# Patient Record
Sex: FEMALE | Race: WHITE
[De-identification: names, ages, dates, MRNs, and addresses within clinical notes are randomized per-mention and may not be internally consistent; named-entity substitution may affect disease eponyms.]

---

## 2018-04-16 NOTE — RADIOLOGY REPORT (SQ)
EXAM DESCRIPTION:  CT HEAD WITHOUT



COMPLETED DATE/TIME:  4/16/2018 5:27 pm



REASON FOR STUDY:  ams



COMPARISON:  None.



TECHNIQUE:  Axial images acquired through the brain without intravenous contrast.  Images reviewed wi
th bone, brain and subdural windows.  Additional sagittal and coronal reconstructions were generated.
 Images stored on PACS.

All CT scanners at this facility use dose modulation, iterative reconstruction, and/or weight based d
osing when appropriate to reduce radiation dose to as low as reasonably achievable (ALARA).

[facility]: Dose Right  [facility]: CareDose    [facility]: Dose Right    [facility]: Teradose 4D    [facility]: Smart Technologies



RADIATION DOSE:  CT Rad equipment meets quality standard of care and radiation dose reduction techniq
ues were employed. CTDIvol: 53.2 mGy. DLP: 1044 mGy-cm. mGy.



LIMITATIONS:  None.



FINDINGS:  VENTRICLES: Normal size and contour.

CEREBRUM: No masses.  No hemorrhage.  No midline shift.  No evidence for acute infarction. Few scatte
red areas of low density in the white matter most likely chronic small vessel ischemic changes.

CEREBELLUM: No masses.  No hemorrhage.  No alteration of density.  No evidence for acute infarction.

EXTRAAXIAL SPACES: No fluid collections.  No masses.

ORBITS AND GLOBE: No intra- or extraconal masses.  Normal contour of globe without masses.

CALVARIUM: No fracture.

PARANASAL SINUSES: No fluid or mucosal thickening.

SOFT TISSUES: No mass or hematoma.

OTHER: No other significant finding.



IMPRESSION:  MILD CHRONIC MICROVASCULAR ISCHEMIA. NO ACUTE IMAGING FINDINGS IN THE BRAIN.

EVIDENCE OF ACUTE STROKE: NO.



COMMENT:  Quality ID # 436: Final reports with documentation of one or more dose reduction techniques
 (e.g., Automated exposure control, adjustment of the mA and/or kV according to patient size, use of 
iterative reconstruction technique)



TECHNICAL DOCUMENTATION:  JOB ID:  [id number]

 2011 [employer]- All Rights Reserved



Reading location - IP/workstation name: [email or web address]

## 2018-04-16 NOTE — ER DOCUMENT REPORT
ED General





- General


Chief Complaint: Weakness


Stated Complaint: WEAKNESS


Time Seen by Provider: 04/16/18 16:52


Mode of Arrival: Ambulatory


TRAVEL OUTSIDE OF THE [place] IN LAST 30 DAYS: No





- HPI


Patient complains to provider of: Confusion yesterday


Onset: Yesterday


Onset/Duration: Gradual, Gone


Quality of pain: No pain


Associated symptoms: Nausea


Exacerbated by: Denies


Relieved by: Denies


Similar symptoms previously: Yes


Recently seen / treated by doctor: No


Notes: 





Patient is an 82-year-old female presenting to the emergency room with  

for complaints of confusion that occurred yesterday, started sometime in the 

morning and resolved around 10 PM at night, patient has a history of similar 

symptoms when she accidentally took an extra dose of her pain medication in the 

past, patient and  are unsure whether this may have happened yesterday 

as well, she denies any symptoms at present except for mild nausea, patient 

denies any pain, no fever, no vomiting or diarrhea, no dysuria or hematuria, no 

history of any recent injury or illness, patient and  deny any evidence 

of slurred speech or weakness on one side of the body or other, there was no 

facial droop at any time either





- Related Data


Allergies/Adverse Reactions: 


 





IVP dye Allergy (Uncoded 04/16/18 16:20)


 











Past Medical History





- General


Information source: Patient





- Social History


Smoking Status: Former Smoker


Chew tobacco use (# tins/day): No


Frequency of alcohol use: None


Drug Abuse: None


Family History: None


Patient has suicidal ideation: No


Patient has homicidal ideation: No





- Past Medical History


Cardiac Medical History: Reports: Hx Hypertension


Endocrine Medical History: Reports: Hx Diabetes Mellitus Type 2


Renal/ Medical History: Denies: Hx Peritoneal Dialysis


Musculoskeltal Medical History: Reports Hx Arthritis


Past Surgical History: Reports: Hx Appendectomy, Hx Hysterectomy, Hx Orthopedic 

Surgery - Right knee replaced, spinal fusion, Hx Tonsillectomy





- Immunizations


Hx Diphtheria, Pertussis, Tetanus Vaccination: Yes





Review of Systems





- Review of Systems


Constitutional: See HPI


EENT: No symptoms reported


Cardiovascular: No symptoms reported


Respiratory: No symptoms reported


Gastrointestinal: No symptoms reported


Genitourinary: No symptoms reported


Female Genitourinary: No symptoms reported


Musculoskeletal: No symptoms reported


Skin: No symptoms reported


Hematologic/Lymphatic: No symptoms reported


Neurological/Psychological: Confusion


-: Yes All other systems reviewed and negative





Physical Exam





- Vital signs


Vitals: 


 











Temp Pulse Resp BP Pulse Ox


 


 98.1 F   97   18   173/77 H  90 L


 


 04/16/18 16:25  04/16/18 16:25  04/16/18 16:25  04/16/18 16:25  04/16/18 16:25











Interpretation: Normal





- General


General appearance: Appears well, Alert





- HEENT


Head: Normocephalic, Atraumatic


Eyes: Normal


Pupils: PERRL





- Respiratory


Respiratory status: No respiratory distress


Chest status: Nontender


Breath sounds: Normal


Chest palpation: Normal





- Cardiovascular


Rhythm: Regular


Heart sounds: Normal auscultation


Murmur: No





- Abdominal


Inspection: Normal


Distension: No distension


Bowel sounds: Normal


Tenderness: Nontender


Organomegaly: No organomegaly





- Back


Back: Normal, Nontender





- Extremities


General upper extremity: Normal inspection, Nontender, Normal color, Normal ROM

, Normal temperature


General lower extremity: Normal inspection, Nontender, Normal color, Normal ROM

, Normal temperature, Normal weight bearing.  No: Adrian's sign





- Neurological


Neuro grossly intact: Yes


Cognition: Normal


Orientation: AAOx4


Kell Coma Scale Eye Opening: Spontaneous


[place] Coma Scale Verbal: Oriented


Kell Coma Scale Motor: Obeys Commands


Kell Coma Scale Total: 15


Speech: Normal


Motor strength normal: LUE, RUE, LLE, RLE


Sensory: Normal





- Psychological


Associated symptoms: Normal affect, Normal mood





- Skin


Skin Temperature: Warm


Skin Moisture: Dry


Skin Color: Normal





Course





- Re-evaluation


Re-evalutation: 





04/16/18 19:06


Patient with confusion that occurred yesterday and resolved around 10 PM last 

night, no evidence of any TIA or CVA, labs are relatively unremarkable except 

for mild leukocytosis without signs of an infection, patient was advised to 

resume her medications this evening, follow-up with primary care or return if 

symptoms worsen, patient and spouse at bedside acknowledge understanding and 

agreement with this plan





- Vital Signs


Vital signs: 


 











Temp Pulse Resp BP Pulse Ox


 


 98.7 F   88   16   136/88 H  96 


 


 04/16/18 19:02  04/16/18 19:02  04/16/18 19:02  04/16/18 19:02  04/16/18 19:02














- Laboratory


Result Diagrams: 


 04/16/18 17:06





 04/16/18 17:06


Laboratory results interpreted by me: 


 











  04/16/18 04/16/18 04/16/18





  17:06 17:06 17:06


 


WBC  16.1 H  


 


RBC  3.60 L  


 


Hgb  10.5 L  


 


Hct  32.2 L  


 


RDW  16.5 H  


 


Seg Neuts % (Manual)  85 H  


 


Lymphocytes % (Manual)  5 L  


 


Abs Neuts (Manual)  13.7 H  


 


Chloride   97 L 


 


Carbon Dioxide   32 H 


 


BUN   64 H 


 


Creatinine   1.87 H 


 


Est GFR ( Amer)   31 L 


 


Est GFR (Non-Af Amer)   26 L 


 


Glucose   199 H 


 


Urine Protein    100 H


 


Urine Blood    SMALL H














- Diagnostic Test


Radiology reviewed: Image reviewed, Reports reviewed





Discharge





- Discharge


Clinical Impression: 


 Confusion





Medication adverse effect


Qualifiers:


 Encounter type: initial encounter Qualified Code(s): T88.7XXA - Unspecified 

adverse effect of drug or medicament, initial encounter





Condition: Stable


Disposition: HOME, SELF-CARE


Instructions:  Medication Side Effects ([facility]), Overdose / Ingestion ([facility])


Additional Instructions: 


Follow up with your primary care provider in one to 2 days.  Return to the 

emergency room immediately if symptoms worsen or any additional concerns.  

Resume your regular medications this evening.  Start taking your medications as 

usual in the morning.

## 2018-04-16 NOTE — ER DOCUMENT REPORT
ED Medical Screen (RME)





- General


Chief Complaint: Weakness


Stated Complaint: WEAKNESS


Time Seen by Provider: 04/16/18 16:52


Mode of Arrival: Ambulatory


Information source: Patient


Notes: 





Patient states that she is felt confused intermittently since yesterday.  

However patient is alert and oriented 4 and answers all questions 

appropriately.  Patient does take chronic pain medication and according to her 

 sometimes accidentally takes more than she is supposed to.  Patient 

denies any abdominal pain.  No nausea vomiting or diarrhea.  No cough cold or 

congestion.  


TRAVEL OUTSIDE OF THE [place] IN LAST 30 DAYS: No





- Related Data


Allergies/Adverse Reactions: 


 





IVP dye Allergy (Uncoded 04/16/18 16:20)


 











Past Medical History





- Social History


Chew tobacco use (# tins/day): No


Frequency of alcohol use: None


Drug Abuse: None





- Past Medical History


Cardiac Medical History: Reports: Hx Hypertension


Endocrine Medical History: Reports: Hx Diabetes Mellitus Type 2


Renal/ Medical History: Denies: Hx Peritoneal Dialysis


Musculoskeltal Medical History: Reports Hx Arthritis


Past Surgical History: Reports: Hx Appendectomy, Hx Hysterectomy, Hx Orthopedic 

Surgery - Right knee replaced, spinal fusion, Hx Tonsillectomy





- Immunizations


Hx Diphtheria, Pertussis, Tetanus Vaccination: Yes





Physical Exam





- Vital signs


Vitals: 





 











Temp Pulse Resp BP Pulse Ox


 


 98.1 F   97   18   173/77 H  90 L


 


 04/16/18 16:25  04/16/18 16:25  04/16/18 16:25  04/16/18 16:25  04/16/18 16:25














Course





- Vital Signs


Vital signs: 





 











Temp Pulse Resp BP Pulse Ox


 


 98.1 F   87   18   173/77 H  96 


 


 04/16/18 16:25  04/16/18 16:41  04/16/18 16:41  04/16/18 16:25  04/16/18 16:41

## 2018-04-18 NOTE — ER DOCUMENT REPORT
ED General





- General


Chief Complaint: Abdominal Pain


Stated Complaint: ABDOMINAL PAIN


Time Seen by Provider: 04/18/18 01:12


Notes: 





Patient is an 82-year-old female with medical history as recorded who presents 

with 24 hours of nausea, vomiting and diarrhea.  She also notes associated 

abdominal cramping as a dull, intermittent, generalized cramping pain.  Nothing 

improves or worsens her symptoms.  She states that she was worried that she was 

becoming dehydrated as she had not been able to tolerate oral intake for the 

past several hours.  She denies any history of similar symptoms in the past.  

She has not seen a primary doctor regarding these concerns.  She denies any 

fever, cough, sputum production, weakness, numbness or altered mental status.  

She was seen in the emergency room and several days ago for possible medication 

reaction with associated confusion but notes that she has not had a recurrence 

of symptoms since that time.


TRAVEL OUTSIDE OF THE [place] IN LAST 30 DAYS: No





- Related Data


Allergies/Adverse Reactions: 


 





IVP dye Allergy (Uncoded 04/16/18 16:20)


 











Past Medical History





- General


Information source: Patient, Relative





- Social History


Smoking Status: Never Smoker


Frequency of alcohol use: None


Drug Abuse: None


Lives with: Spouse/Significant other


Family History: Reviewed & Not Pertinent





- Past Medical History


Cardiac Medical History: Reports: Hx Hypertension


Endocrine Medical History: Reports: Hx Diabetes Mellitus Type 2


Renal/ Medical History: Denies: Hx Peritoneal Dialysis


Musculoskeltal Medical History: Reports Hx Arthritis


Past Surgical History: Reports: Hx Appendectomy, Hx Hysterectomy, Hx Orthopedic 

Surgery - Right knee replaced, spinal fusion, Hx Tonsillectomy





- Immunizations


Hx Diphtheria, Pertussis, Tetanus Vaccination: Yes





Review of Systems





- Review of Systems


Notes: 





Constitutional: Negative for fever.


HENT: Negative for sore throat.


Eyes: Negative for visual changes.


Cardiovascular: Negative for chest pain.


Respiratory: Negative for shortness of breath.


Gastrointestinal: Positive for abdominal cramping, vomiting and diarrhea


Genitourinary: Negative for dysuria.


Musculoskeletal: Negative for back pain.


Skin: Negative for rash.


Neurological: Negative for headaches, weakness or numbness.





10 point ROS negative except as marked above and in HPI.





Physical Exam





- Vital signs


Vitals: 


 











Temp Pulse Resp BP Pulse Ox


 


 98.6 F   91   16   147/77 H  92 


 


 04/18/18 00:14  04/18/18 00:14  04/18/18 00:14  04/18/18 00:14  04/18/18 00:14











Interpretation: Normal


Notes: 





PHYSICAL EXAMINATION:





GENERAL: Well-appearing, well-nourished and in no acute distress.





HEAD: Atraumatic, normocephalic.





EYES: Pupils equal round and reactive to light, extraocular movements intact, 

sclera anicteric, conjunctiva are normal.





ENT: nares patent, oropharynx clear without exudates.  Moderately dry mucous 

membranes.





NECK: Normal range of motion, supple without lymphadenopathy





LUNGS: Breath sounds clear to auscultation bilaterally and equal.  No wheezes 

rales or rhonchi.





HEART: Regular rate and rhythm without murmurs





ABDOMEN: Soft, nontender, normoactive bowel sounds.  No guarding, no rebound.  

No masses appreciated.





EXTREMITIES: Normal range of motion, no pitting or edema.  No cyanosis.





NEUROLOGICAL: No focal neurological deficits. Moves all extremities 

spontaneously and on command.





PSYCH: Normal mood, normal affect.





SKIN: Warm, Dry, normal turgor, no rashes or lesions noted.





Course





- Re-evaluation


Re-evalutation: 





04/18/18 01:41


Patient presents with complaints of abdominal cramping, nausea, vomiting and 

diarrhea that has been present for the past 12-24 hours.  She is otherwise very 

well in appearance on exam, vitals normal limits, no evidence of dehydration.  

She has no focal abdominal tenderness on examination.  No rebound or guarding.  

Will obtain basic laboratories, I do not see an obvious indication for imaging 

at this time as I have a very low clinical suspicion for any life-threatening 

pathology including acute appendicitis, biliary colic, mesenteric ischemia, 

aortic pathology, or bowel obstruction


04/18/18 02:27


Laboratories show that the patient's leukocytosis is actually improving, renal 

function is likewise improving.  Patient has not had any vomiting or diarrhea 

while here in the emergency department.  Her daughter pulls me aside and 

actually tells me that the patient is "dramatic and kind of crazy".  She states 

that she believes the patient may be seeking attention and that she has not 

witnessed any evidence of vomiting or diarrhea at home.  Repeat abdominal exam 

without any focal tenderness, rebound or guarding.  At this time will discharge 

with return precautions and follow-up recommendations.  Verbal discharge 

instructions given a the bedside and opportunity for questions given. 

Medication warnings reviewed. Patient is in agreement with this plan and has 

verbalized understanding of return precautions and the need for primary care 

follow-up in the next 24-72 hours.





- Vital Signs


Vital signs: 


 











Temp Pulse Resp BP Pulse Ox


 


 98.6 F   91   16   147/77 H  92 


 


 04/18/18 00:14  04/18/18 00:14  04/18/18 00:14  04/18/18 00:14  04/18/18 00:14














- Laboratory


Result Diagrams: 


 04/18/18 01:30





 04/18/18 01:30


Laboratory results interpreted by me: 


 











  04/18/18 04/18/18





  01:30 01:30


 


WBC  15.9 H 


 


RBC  3.67 L 


 


Hgb  10.7 L 


 


Hct  32.2 L 


 


RDW  16.4 H 


 


Seg Neuts % (Manual)  93 H 


 


Lymphocytes % (Manual)  2 L 


 


Abs Neuts (Manual)  14.8 H 


 


Abs Lymphs (Manual)  0.3 L 


 


BUN   48 H


 


Creatinine   1.51 H


 


Est GFR ( Amer)   40 L


 


Est GFR (Non-Af Amer)   33 L


 


Glucose   171 H


 


Direct Bilirubin   0.5 H














Discharge





- Discharge


Clinical Impression: 


 Vomiting and diarrhea, Abdominal cramping, Dehydration





Condition: Good


Disposition: HOME, SELF-CARE


Additional Instructions: 


Your symptoms are likely due to a viral illness and should resolve in the next 

several days.  You can take over-the-counter loperamide also known as Imodium 

as needed for diarrhea per box instructions.  Continue to stay hydrated with 

plenty of solution such as Gatorade or Pedialyte.  You are being prescribed 

Zofran to take as needed for nausea and vomiting.  Please return if you develop 

severe abdominal pain, pass out, become unable to tolerate any oral fluids for 

12 more hours, or any other symptoms that are concerning to you.

## 2018-04-29 NOTE — EKG REPORT
SEVERITY:- ABNORMAL ECG -

ATRIAL FIBRILLATION

LOW VOLTAGE IN FRONTAL LEADS

NONSPECIFIC T ABNORMALITIES, LATERAL LEADS

:

Confirmed by: Petros Donato MD 29-Apr-2018 17:59:10

## 2018-04-29 NOTE — RADIOLOGY REPORT (SQ)
EXAM DESCRIPTION:  CT ABD/PELVIS ORAL ONLY



COMPLETED DATE/TIME:  4/29/2018 6:19 pm



REASON FOR STUDY:  diffuse abdominal pain h/o cancer



COMPARISON:  None.



TECHNIQUE:  CT scan of the abdomen and pelvis performed without intravenous or oral contrast. Images 
reviewed with lung, soft tissue, and bone windows. Reconstructed coronal and sagittal MPR images revi
ewed. All images stored on PACS.

All CT scanners at this facility use dose modulation, iterative reconstruction, and/or weight based d
osing when appropriate to reduce radiation dose to as low as reasonably achievable (ALARA).

[facility]: Dose Right  [facility]: CareDose    [facility]: Dose Right    [facility]: Teradose 4D    [facility]: Smart [employer]



RADIATION DOSE:  CT Rad equipment meets quality standard of care and radiation dose reduction techniq
ues were employed. CTDIvol: 13.4 mGy. DLP: 680 mGy-cm.mGy.



LIMITATIONS:  None.



FINDINGS:  LOWER CHEST: Small left pleural effusion.

NON-CONTRASTED LIVER, SPLEEN, ADRENALS: Evaluation limited by lack of IV contrast.  Small Calcificati
ons in the left lobe.  No identified significant masses.

PANCREAS: Mild peripancreatic inflammatory changes adjacent to the pancreatic head- proximal duodenum
.

GALLBLADDER: No identified stones by CT criteria. No inflammatory changes to suggest cholecystitis.

RIGHT KIDNEY AND URETER: Multiple cysts. Assessment limited by lack of IV contrast.   No significant 
calcifications.   No hydronephrosis or hydroureter.

LEFT KIDNEY AND URETER: Multiple cysts. Assessment limited by lack of IV contrast.   No significant c
alcifications.   No hydronephrosis or hydroureter.

AORTA AND RETROPERITONEUM: No aneurysm. No retroperitoneal masses or adenopathy.

BOWEL AND PERITONEAL CAVITY: Mild wall thickening in the region of the pylorus and proximal duodenum 
with adjacent mesenteric inflammatory changes.  Colonic diverticulosis.

APPENDIX: Not visualized.

PELVIS, BLADDER, AND ABDOMINAL WALL:Prior hysterectomy. No free fluid. Bladder normal.

BONES: No acute findings.

OTHER: No other significant finding.



IMPRESSION:  Mild wall thickening in the region of the pylorus and proximal duodenum with adjacent me
senteric inflammatory changes.  Mild peripancreatic inflammatory changes adjacent to the pancreatic h
ead- proximal duodenum.

Small left pleural effusion.



COMMENT:  Quality ID # 436: Final reports with documentation of one or more dose reduction techniques
 (e.g., Automated exposure control, adjustment of the mA and/or kV according to patient size, use of 
iterative reconstruction technique)



TECHNICAL DOCUMENTATION:  JOB ID:  [id number]

[id number]

 2011 [employer]- All Rights Reserved



Reading location - IP/workstation name: [email or web address]

## 2018-04-29 NOTE — RADIOLOGY REPORT (SQ)
EXAM DESCRIPTION:  CHEST 2 VIEWS



COMPLETED DATE/TIME:  4/29/2018 6:17 pm



REASON FOR STUDY:  Short of breath



COMPARISON:  None.



EXAM PARAMETERS:  NUMBER OF VIEWS: two views

TECHNIQUE: Digital Frontal and Lateral radiographic views of the chest acquired.

RADIATION DOSE: NA

LIMITATIONS: none



FINDINGS:  LUNGS AND PLEURA: No consolidation, masses or pneumothorax.  Mild chronic appearing inters
titial changes.  No pleural effusion.

MEDIASTINUM AND HILAR STRUCTURES: Age-appropriate contour.

HEART AND VASCULAR STRUCTURES: Heart upper limits of normal size.

BONES: No acute findings.

HARDWARE: None in the chest.

OTHER: No other significant finding.



IMPRESSION:  No consolidation or pleural effusion.



TECHNICAL DOCUMENTATION:  JOB ID:  [id number]

[id number]

 2011 [employer]- All Rights Reserved



Reading location - IP/workstation name: [email or web address]

## 2018-04-29 NOTE — ER DOCUMENT REPORT
ED Medical Screen (RME)





- General


Chief Complaint: Abdominal Pain


Stated Complaint: STOMACH PAIN


Time Seen by Provider: 04/29/18 14:04


Notes: 





Patient says that she is been having stomach pains for the past 3 days.  Pains 

are located in the lower central portion of the abdomen.  Has been nauseated 

and has had some dry heaves this morning but has not had any actual emesis.  

She had some diarrhea yesterday, less today.  She suffers from constipation.  

Patient has a history of surgery removing colon cancer.  Never had to have 

chemo or radiation treatment.  Has not been sick recently.  Denies fever.





Patient has some shortness of breath, and it is worse in recent days.  History 

of COPD.  Does not smoke.


TRAVEL OUTSIDE OF THE [place] IN LAST 30 DAYS: No





- Related Data


Allergies/Adverse Reactions: 


 





IVP dye Allergy (Uncoded 04/29/18 13:02)


 











Past Medical History





- Social History


Chew tobacco use (# tins/day): No


Frequency of alcohol use: None


Drug Abuse: None





- Past Medical History


Cardiac Medical History: Reports: Hx Hypertension


Endocrine Medical History: Reports: Hx Diabetes Mellitus Type 2


Renal/ Medical History: Denies: Hx Peritoneal Dialysis


Musculoskeltal Medical History: Reports Hx Arthritis - in pain management


Past Surgical History: Reports: Hx Appendectomy, Hx Bowel Surgery - umbilical 

hernia, Hx Hysterectomy, Hx Orthopedic Surgery - Right knee replaced, spinal 

fusion, Hx Tonsillectomy





- Immunizations


Hx Diphtheria, Pertussis, Tetanus Vaccination: Yes





Physical Exam





- Vital signs


Vitals: 





 











Temp Pulse Resp BP Pulse Ox


 


 97.6 F   75   18   175/71 H  96 


 


 04/29/18 13:07  04/29/18 13:07  04/29/18 13:07  04/29/18 13:07  04/29/18 13:07














Course





- Vital Signs


Vital signs: 





 











Temp Pulse Resp BP Pulse Ox


 


 97.6 F   75   18   175/71 H  96 


 


 04/29/18 13:07  04/29/18 13:07  04/29/18 13:07  04/29/18 13:07  04/29/18 13:07














Doctor's Discharge





- Discharge


Referrals: 


JEERL ROPER PA-C [Primary Care Provider] - Follow up as needed

## 2018-04-29 NOTE — ER DOCUMENT REPORT
ED General





- General


Chief Complaint: Abdominal Pain


Stated Complaint: STOMACH PAIN


Time Seen by Provider: 04/29/18 14:04


Mode of Arrival: Ambulatory


Information source: Patient


Notes: 





82-year-old female with a history of colon cancer, constipation, diabetes, 

hypertension, COPD, renal insufficiency presents with complaint of abdominal 

pain that started 3 days prior to arrival.  Patient states pain is constant, 

burning and located diffusely.  She has had prior similar symptoms. she has had 

associated nausea without vomiting.  Patient has also been experiencing 

intermittent episodes of diarrhea that she states is normal in color.  Patient 

is currently in pain management for chronic back pain and takes morphine daily.

  Patient's last colonoscopy was less than a year ago and patient and  

report that they were told "everything looked good."  Patient denies any sick 

contacts, recent antibiotic use, recent hospitalizations.  She denies any black 

or bloody stools.  She does admit to urinary frequency, burning with urination.

  She denies any vaginal discharge.  She has surgical history of colectomy, 

hysterectomy, appendectomy.  She states she is passing gas.


TRAVEL OUTSIDE OF THE [place] IN LAST 30 DAYS: No





- HPI


Onset: Other - 3 days prior to arrival


Onset/Duration: Gradual, Persistent


Quality of pain: Burning


Severity: Mild


Associated symptoms: Diarrhea, Nausea.  denies: Chest pain, Fever, Vomiting


Exacerbated by: Food


Relieved by: Denies


Similar symptoms previously: No


Recently seen / treated by doctor: No





- Related Data


Allergies/Adverse Reactions: 


 





IVP dye Allergy (Uncoded 04/29/18 13:02)


 











Past Medical History





- General


Information source: Patient





- Social History


Smoking Status: Former Smoker


Chew tobacco use (# tins/day): No


Frequency of alcohol use: None


Drug Abuse: None


Lives with: Spouse/Significant other


Family History: Reviewed & Not Pertinent


Patient has suicidal ideation: No


Patient has homicidal ideation: No





- Past Medical History


Cardiac Medical History: Reports: Hx Atrial Fibrillation, Hx Hypertension


Pulmonary Medical History: Reports: Hx COPD


Endocrine Medical History: Reports: Hx Diabetes Mellitus Type 2, Other - Renal 

insufficiency


Renal/ Medical History: Denies: Hx Peritoneal Dialysis


Malignancy Medical History: Reports: Hx Colorectal Cancer


Musculoskeltal Medical History: Reports Hx Arthritis - in pain management


Past Surgical History: Reports: Hx Appendectomy, Hx Bowel Surgery - umbilical 

hernia, Hx Hysterectomy, Hx Orthopedic Surgery - Right knee replaced, spinal 

fusion, Hx Tonsillectomy





- Immunizations


Hx Diphtheria, Pertussis, Tetanus Vaccination: Yes





Review of Systems





- Review of Systems


Notes: 





Patient denies fever, chills, vomiting, headache, ear pain, sore throat, cough, 

chest pain, shortness of breath, back pain,  hematuria, rash, SI/HI.  Admits to 

abdominal pain, nausea, diarrhea, dysuria.





Physical Exam





- Vital signs


Vitals: 


 











Temp Pulse Resp BP Pulse Ox


 


 97.6 F   75   18   175/71 H  96 


 


 04/29/18 13:07  04/29/18 13:07  04/29/18 13:07  04/29/18 13:07  04/29/18 13:07











Interpretation: Normal, Hypertensive.  No: Tachycardic, Hypoxic, Febrile


Notes: 





PHYSICAL EXAMINATION:





GENERAL: Well-appearing, well-nourished and in no acute distress.





HEAD: Atraumatic, normocephalic.





EYES: Pupils equal round and reactive to light, extraocular movements intact, 

conjunctiva are normal.





ENT: Nares patent, oropharynx clear without exudates.  Moist mucous membranes.





NECK: Normal range of motion, supple without lymphadenopathy





LUNGS: Breath sounds clear to auscultation bilaterally and equal.  No wheezes 

rales or rhonchi.





HEART: Regular rate and rhythm without murmurs





ABDOMEN: Diffuse tenderness with palpation, no guarding, no rebound.  No masses 

appreciated.





Female : deferred





Musculoskeletal: Normal range of motion, no pitting or edema.  No cyanosis.





NEUROLOGICAL: Cranial nerves grossly intact.  Normal speech, normal gait.  

Normal sensory, motor exams





PSYCH: Normal mood, normal affect.





SKIN: Warm, Dry, normal turgor, no rashes or lesions noted.





- Notes


Notes: 





PHYSICAL EXAMINATION:





GENERAL: Well-appearing, well-nourished and in no acute distress.





HEAD: Atraumatic, normocephalic.





EYES: Pupils equal round and reactive to light, extraocular movements intact, 

conjunctiva are normal.





ENT: Nares patent, oropharynx clear without exudates.  Moist mucous membranes.





NECK: Normal range of motion, supple without lymphadenopathy





LUNGS: Breath sounds clear to auscultation bilaterally and equal.  No wheezes 

rales or rhonchi.





HEART: Regular rate and rhythm without murmurs





ABDOMEN: Soft, nontender, nondistended abdomen.  No guarding, no rebound.  No 

masses appreciated. 





Female : deferred





Musculoskeletal: Normal range of motion, no pitting or edema.  No cyanosis.





NEUROLOGICAL: Cranial nerves grossly intact.  Normal speech, normal gait.  

Normal sensory, motor exams





PSYCH: Normal mood, normal affect.





SKIN: Warm, Dry, normal turgor, no rashes or lesions noted.





Course





- Re-evaluation


Re-evalutation: 





05/01/18 00:09





Laboratory











  04/29/18 04/29/18 04/29/18





  14:52 14:52 14:52


 


WBC  14.0 H  


 


RBC  3.38 L  


 


Hgb  9.7 L  


 


Hct  29.5 L  


 


MCV  87  


 


MCH  28.6  


 


MCHC  32.7  


 


RDW  15.3 H  


 


Plt Count  286  


 


Seg Neutrophils %  86.6 H  


 


Lymphocytes %  6.2 L  


 


Monocytes %  5.5  


 


Eosinophils %  1.2  


 


Basophils %  0.5  


 


Absolute Neutrophils  12.1 H  


 


Absolute Lymphocytes  0.9  


 


Absolute Monocytes  0.8  


 


Absolute Eosinophils  0.2  


 


Absolute Basophils  0.1  


 


Sodium   142.4 


 


Potassium   4.7 


 


Chloride   99 


 


Carbon Dioxide   36 H 


 


Anion Gap   7 


 


BUN   27 H 


 


Creatinine   1.37 H 


 


Est GFR ( Amer)   45 L 


 


Est GFR (Non-Af Amer)   37 L 


 


Glucose   138 H 


 


Calcium   9.5 


 


Total Bilirubin   0.6 


 


Direct Bilirubin   0.3 


 


Neonat Total Bilirubin   Not Reportable 


 


Neonat Direct Bilirubin   Not Reportable 


 


Neonat Indirect Bili   Not Reportable 


 


AST   13 L 


 


ALT   25 


 


Alkaline Phosphatase   78 


 


CK-MB (CK-2)    0.80


 


Troponin I    0.026


 


NT-Pro-B Natriuret Pep    4590 H


 


Total Protein   6.8 


 


Albumin   3.6 


 


Lipase   94.3 


 


Urine Color   


 


Urine Appearance   


 


Urine pH   


 


Ur Specific Gravity   


 


Urine Protein   


 


Urine Glucose (UA)   


 


Urine Ketones   


 


Urine Blood   


 


Urine Nitrite   


 


Urine Bilirubin   


 


Urine Urobilinogen   


 


Ur Leukocyte Esterase   


 


Urine WBC (Auto)   


 


Urine RBC (Auto)   


 


Urine Bacteria (Auto)   


 


Squamous Epi Cells Auto   


 


Urine Mucus (Auto)   


 


Urine Ascorbic Acid   














  04/29/18





  15:52


 


WBC 


 


RBC 


 


Hgb 


 


Hct 


 


MCV 


 


MCH 


 


MCHC 


 


RDW 


 


Plt Count 


 


Seg Neutrophils % 


 


Lymphocytes % 


 


Monocytes % 


 


Eosinophils % 


 


Basophils % 


 


Absolute Neutrophils 


 


Absolute Lymphocytes 


 


Absolute Monocytes 


 


Absolute Eosinophils 


 


Absolute Basophils 


 


Sodium 


 


Potassium 


 


Chloride 


 


Carbon Dioxide 


 


Anion Gap 


 


BUN 


 


Creatinine 


 


Est GFR ( Amer) 


 


Est GFR (Non-Af Amer) 


 


Glucose 


 


Calcium 


 


Total Bilirubin 


 


Direct Bilirubin 


 


Neonat Total Bilirubin 


 


Neonat Direct Bilirubin 


 


Neonat Indirect Bili 


 


AST 


 


ALT 


 


Alkaline Phosphatase 


 


CK-MB (CK-2) 


 


Troponin I 


 


NT-Pro-B Natriuret Pep 


 


Total Protein 


 


Albumin 


 


Lipase 


 


Urine Color  YELLOW


 


Urine Appearance  CLEAR


 


Urine pH  7.0


 


Ur Specific Gravity  1.012


 


Urine Protein  >=500 H


 


Urine Glucose (UA)  NEGATIVE


 


Urine Ketones  NEGATIVE


 


Urine Blood  SMALL H


 


Urine Nitrite  NEGATIVE


 


Urine Bilirubin  NEGATIVE


 


Urine Urobilinogen  NEGATIVE


 


Ur Leukocyte Esterase  NEGATIVE


 


Urine WBC (Auto)  1


 


Urine RBC (Auto)  2


 


Urine Bacteria (Auto)  TRACE


 


Squamous Epi Cells Auto  2


 


Urine Mucus (Auto)  RARE


 


Urine Ascorbic Acid  NEGATIVE














 





Chest X-Ray  04/29/18 14:04


IMPRESSION:  No consolidation or pleural effusion.


 








Abdomen/Pelvis CT  04/29/18 15:02


IMPRESSION:  Mild wall thickening in the region of the pylorus and proximal 

duodenum with adjacent mesenteric inflammatory changes.  Mild peripancreatic 

inflammatory changes adjacent to the pancreatic head- proximal duodenum.


Small left pleural effusion.


 











05/01/18 00:11


82-year-old female with a history of colon cancer, constipation, diabetes, 

hypertension, COPD, renal insufficiency presents with complaint of abdominal 

pain that started 3 days prior to arrival.  Patient states pain is constant, 

burning and located diffusely.  She has had prior similar symptoms. she has had 

associated nausea without vomiting.  Patient has also been experiencing 

intermittent episodes of diarrhea.  Upon arrival vitals reviewed.  Patient is 

afebrile, mildly hypertensive but with a normal heart rate and oxygen 

saturation.  Abdominal exam is significant for diffuse mild tenderness.  There 

is no guarding or rebound or evidence of peritonitis.  CT with oral contrast 

was obtained and showed mild wall thickening around the pylorus, duodenum and 

mild hina-pancreatic inflammation as well as a small left pleural effusion.  

Patient has a leukocytosis but when compared to recent blood work this has 

improved.  Patient also has renal insufficiency but again this is improved when 

compared to blood work done 2 weeks prior to this visit.  Patient has an 

elevated BNP of 4500.  No previous BNPs to compare at this time.  Patient was 

provided Zofran, morphine, Lasix during her ED course.  Patient has been 

tolerating fluids, asking for food during throughout her visit.  I did discuss 

findings with the patient, her  and daughter.  I spoke to the daughter 

over the mother's cell phone and she asked me to please encourage the patient 

had a healthier diet, stay away from soda.  I have done this and recommend that 

the patient exercise bowel rest for the next few days taking in clear fluids 

only.  Patient is very upset with this request.  Although the patient does have 

discontinues her evidence of colitis I do not feel hospitalization is warranted 

at this time since she is able to eat and drink without difficulty.  I believe 

if patient practices proper diet habits which we have discussed, chronic pain 

medication and participates and bowel rest for the next few days which we have 

also discussed that she will do okay.  I have encouraged the patient to return 

to the emergency department if abdominal pain worsens, she is unable to 

tolerate fluids.  Patient,  and daughter are comfortable with discharge 

at this time. 





- Vital Signs


Vital signs: 


 











Temp Pulse Resp BP Pulse Ox


 


 98 F   76   18   160/85 H  93 


 


 04/29/18 20:02  04/29/18 20:02  04/29/18 20:02  04/29/18 20:02  04/29/18 20:02














- Laboratory


Result Diagrams: 


 04/29/18 14:52





 04/29/18 14:52


Laboratory results interpreted by me: 


 











  04/29/18 04/29/18 04/29/18





  14:52 14:52 14:52


 


WBC  14.0 H  


 


RBC  3.38 L  


 


Hgb  9.7 L  


 


Hct  29.5 L  


 


RDW  15.3 H  


 


Seg Neutrophils %  86.6 H  


 


Lymphocytes %  6.2 L  


 


Absolute Neutrophils  12.1 H  


 


Carbon Dioxide   36 H 


 


BUN   27 H 


 


Creatinine   1.37 H 


 


Est GFR ( Amer)   45 L 


 


Est GFR (Non-Af Amer)   37 L 


 


Glucose   138 H 


 


AST   13 L 


 


NT-Pro-B Natriuret Pep    4590 H


 


Urine Protein   


 


Urine Blood   














  04/29/18





  15:52


 


WBC 


 


RBC 


 


Hgb 


 


Hct 


 


RDW 


 


Seg Neutrophils % 


 


Lymphocytes % 


 


Absolute Neutrophils 


 


Carbon Dioxide 


 


BUN 


 


Creatinine 


 


Est GFR ( Amer) 


 


Est GFR (Non-Af Amer) 


 


Glucose 


 


AST 


 


NT-Pro-B Natriuret Pep 


 


Urine Protein  >=500 H


 


Urine Blood  SMALL H














- Diagnostic Test


Radiology reviewed: Image reviewed, Reports reviewed





Discharge





- Discharge


Clinical Impression: 


 Colitis, Nausea





Abdominal pain


Qualifiers:


 Abdominal location: generalized Qualified Code(s): R10.84 - Generalized 

abdominal pain





Condition: Good


Disposition: HOME, SELF-CARE


Instructions:  Abdominal Pain ([facility]), Colitis, Nonspecific ([facility]), Nausea or 

Vomiting, Nonspecific ([facility])


Additional Instructions: 


Your CAT scan today showed inflammation of your colon and small bowel.  Your 

lab work shows improvement of your white count and kidney function.  Please 

maintain a healthy diet and avoid fast food, greasy food, soda.  Follow up with 

your physician tomorrow for further care or return to the ED IMMEDIATELY if 

symptoms worsen or new concerns occur. If you cannot afford to follow up with 

your primary care physician a list of low cost clinics have been provided at 

the end of your discharge papers as well.


Prescriptions: 


Famotidine [Pepcid 40 mg Tablet] 40 mg PO DAILY #7 tablet


Ondansetron [Zofran Odt 4 mg Tablet] 1 - 2 tab PO Q4H PRN #8 tab.rapdis


 PRN Reason: For Nausea/Vomiting


Forms:  Elevated Blood Pressure


Referrals: 


JEREL ROPER PA-C [Primary Care Provider] - 04/30/18

## 2018-05-09 ENCOUNTER — HOSPITAL ENCOUNTER (EMERGENCY)
Dept: HOSPITAL 62 - ER | Age: 83
Discharge: HOME | End: 2018-05-09
Payer: MEDICARE

## 2018-05-09 VITALS — DIASTOLIC BLOOD PRESSURE: 77 MMHG | SYSTOLIC BLOOD PRESSURE: 153 MMHG

## 2018-05-09 DIAGNOSIS — Z91.041: ICD-10-CM

## 2018-05-09 DIAGNOSIS — Z79.01: ICD-10-CM

## 2018-05-09 DIAGNOSIS — I48.2: ICD-10-CM

## 2018-05-09 DIAGNOSIS — Z85.048: ICD-10-CM

## 2018-05-09 DIAGNOSIS — R42: Primary | ICD-10-CM

## 2018-05-09 DIAGNOSIS — J44.9: ICD-10-CM

## 2018-05-09 DIAGNOSIS — I10: ICD-10-CM

## 2018-05-09 DIAGNOSIS — E11.9: ICD-10-CM

## 2018-05-09 DIAGNOSIS — Z79.899: ICD-10-CM

## 2018-05-09 LAB
ADD MANUAL DIFF: NO
ALBUMIN SERPL-MCNC: 3.9 G/DL (ref 3.5–5)
ALP SERPL-CCNC: 80 U/L (ref 38–126)
ALT SERPL-CCNC: 20 U/L (ref 9–52)
ANION GAP SERPL CALC-SCNC: 11 MMOL/L (ref 5–19)
APPEARANCE UR: CLEAR
APTT PPP: YELLOW S
AST SERPL-CCNC: 21 U/L (ref 14–36)
BASOPHILS # BLD AUTO: 0.1 10^3/UL (ref 0–0.2)
BASOPHILS NFR BLD AUTO: 0.8 % (ref 0–2)
BILIRUB DIRECT SERPL-MCNC: 0.5 MG/DL (ref 0–0.4)
BILIRUB SERPL-MCNC: 0.7 MG/DL (ref 0.2–1.3)
BILIRUB UR QL STRIP: NEGATIVE
BUN SERPL-MCNC: 26 MG/DL (ref 7–20)
CALCIUM: 9.9 MG/DL (ref 8.4–10.2)
CHLORIDE SERPL-SCNC: 102 MMOL/L (ref 98–107)
CK MB SERPL-MCNC: 0.8 NG/ML (ref ?–4.55)
CK SERPL-CCNC: 35 U/L (ref 30–135)
CO2 SERPL-SCNC: 33 MMOL/L (ref 22–30)
EOSINOPHIL # BLD AUTO: 0.3 10^3/UL (ref 0–0.6)
EOSINOPHIL NFR BLD AUTO: 2.1 % (ref 0–6)
ERYTHROCYTE [DISTWIDTH] IN BLOOD BY AUTOMATED COUNT: 16.2 % (ref 11.5–14)
GLUCOSE SERPL-MCNC: 129 MG/DL (ref 75–110)
GLUCOSE UR STRIP-MCNC: NEGATIVE MG/DL
HCT VFR BLD CALC: 33.5 % (ref 36–47)
HGB BLD-MCNC: 11 G/DL (ref 12–15.5)
KETONES UR STRIP-MCNC: NEGATIVE MG/DL
LIPASE SERPL-CCNC: 170 U/L (ref 23–300)
LYMPHOCYTES # BLD AUTO: 1.3 10^3/UL (ref 0.5–4.7)
LYMPHOCYTES NFR BLD AUTO: 10.1 % (ref 13–45)
MCH RBC QN AUTO: 28.6 PG (ref 27–33.4)
MCHC RBC AUTO-ENTMCNC: 32.9 G/DL (ref 32–36)
MCV RBC AUTO: 87 FL (ref 80–97)
MONOCYTES # BLD AUTO: 0.9 10^3/UL (ref 0.1–1.4)
MONOCYTES NFR BLD AUTO: 7 % (ref 3–13)
NEUTROPHILS # BLD AUTO: 10.6 10^3/UL (ref 1.7–8.2)
NEUTS SEG NFR BLD AUTO: 80 % (ref 42–78)
NITRITE UR QL STRIP: NEGATIVE
PH UR STRIP: 6 [PH] (ref 5–9)
PLATELET # BLD: 300 10^3/UL (ref 150–450)
POTASSIUM SERPL-SCNC: 4.4 MMOL/L (ref 3.6–5)
PROT SERPL-MCNC: 7.5 G/DL (ref 6.3–8.2)
PROT UR STRIP-MCNC: 100 MG/DL
RBC # BLD AUTO: 3.85 10^6/UL (ref 3.72–5.28)
SODIUM SERPL-SCNC: 145.8 MMOL/L (ref 137–145)
SP GR UR STRIP: 1.01
TOTAL CELLS COUNTED % (AUTO): 100 %
TROPONIN I SERPL-MCNC: 0.04 NG/ML
UROBILINOGEN UR-MCNC: NEGATIVE MG/DL (ref ?–2)
WBC # BLD AUTO: 13.3 10^3/UL (ref 4–10.5)

## 2018-05-09 PROCEDURE — 83735 ASSAY OF MAGNESIUM: CPT

## 2018-05-09 PROCEDURE — 85025 COMPLETE CBC W/AUTO DIFF WBC: CPT

## 2018-05-09 PROCEDURE — 36415 COLL VENOUS BLD VENIPUNCTURE: CPT

## 2018-05-09 PROCEDURE — 70551 MRI BRAIN STEM W/O DYE: CPT

## 2018-05-09 PROCEDURE — 83690 ASSAY OF LIPASE: CPT

## 2018-05-09 PROCEDURE — 96374 THER/PROPH/DIAG INJ IV PUSH: CPT

## 2018-05-09 PROCEDURE — 84484 ASSAY OF TROPONIN QUANT: CPT

## 2018-05-09 PROCEDURE — 80162 ASSAY OF DIGOXIN TOTAL: CPT

## 2018-05-09 PROCEDURE — 99285 EMERGENCY DEPT VISIT HI MDM: CPT

## 2018-05-09 PROCEDURE — 81001 URINALYSIS AUTO W/SCOPE: CPT

## 2018-05-09 PROCEDURE — 70450 CT HEAD/BRAIN W/O DYE: CPT

## 2018-05-09 PROCEDURE — 93010 ELECTROCARDIOGRAM REPORT: CPT

## 2018-05-09 PROCEDURE — 82550 ASSAY OF CK (CPK): CPT

## 2018-05-09 PROCEDURE — 71046 X-RAY EXAM CHEST 2 VIEWS: CPT

## 2018-05-09 PROCEDURE — 93005 ELECTROCARDIOGRAM TRACING: CPT

## 2018-05-09 PROCEDURE — 82553 CREATINE MB FRACTION: CPT

## 2018-05-09 PROCEDURE — 80053 COMPREHEN METABOLIC PANEL: CPT

## 2018-05-09 NOTE — EKG REPORT
SEVERITY:- ABNORMAL ECG -

ATRIAL FIBRILLATION, V-RATE 

:

Confirmed by: Petros Donato MD 09-May-2018 18:21:52

## 2018-05-09 NOTE — RADIOLOGY REPORT (SQ)
EXAM DESCRIPTION:  MRI HEAD WITHOUT



COMPLETED DATE/TIME:  5/9/2018 8:00 pm



REASON FOR STUDY:  vertigo, A-fib, not respond to meclizine



COMPARISON:  CT head 5/9/2018



TECHNIQUE:  Multiplanar imaging includes non-contrasted T1, T2, FLAIR, and diffusion with ADC map seq
uences. Images stored on PACS.



LIMITATIONS:  None.



FINDINGS:  ANATOMY: No anomalies. Normal vascular flow voids. Pituitary fossa normal.

CSF SPACES: Normal in size and contour. No hemorrhage.

CEREBRUM: Sulci and gyri normal in size and contour. Normal white matter signal on FLAIR imaging.  No
 evidence of hemorrhage, mass, or extraaxial fluid collection.

POSTERIOR FOSSA: No signal alteration. No hemorrhage. No edema, masses or mass effect.  Internal adi
tory canals, cerebello-pontine angles, mastoids normal.

DIFFUSION IMAGING: Negative for acute or sub-acute infarction.

ORBITS: No masses. Globes normal.

PARANASAL  SINUSES: No fluid levels.  Mucosa normal.

OTHER: No other significant finding.



IMPRESSION:  NORMAL MRI OF THE BRAIN WITHOUT INTRAVENOUS GADOLINIUM CONTRAST.

EVIDENCE OF ACUTE STROKE: NO.



TECHNICAL DOCUMENTATION:  JOB ID:  [id number]

 2011 [employer]- All Rights Reserved



Reading location - IP/workstation name: [email or web address]

## 2018-05-09 NOTE — ER DOCUMENT REPORT
ED Medical Screen (RME)





- General


Chief Complaint: Dizziness


Stated Complaint: DIZZINESS


Time Seen by Provider: 05/09/18 15:41


TRAVEL OUTSIDE OF THE [place] IN LAST 30 DAYS: No





- HPI


Notes: 





05/09/18 15:42


Dizziness ongoing for a week worse with movement on meclizine





- Related Data


Allergies/Adverse Reactions: 


 





IVP dye Allergy (Uncoded 05/09/18 15:39)


 











Past Medical History





- Social History


Chew tobacco use (# tins/day): No


Frequency of alcohol use: None


Drug Abuse: None





- Past Medical History


Cardiac Medical History: Reports: Hx Atrial Fibrillation, Hx Hypertension


Pulmonary Medical History: Reports: Hx COPD


Endocrine Medical History: Reports: Hx Diabetes Mellitus Type 2


Renal/ Medical History: Denies: Hx Peritoneal Dialysis


Malignancy Medical History: Reports: Hx Colorectal Cancer


Musculoskeltal Medical History: Reports Hx Arthritis - in pain management


Past Surgical History: Reports: Hx Appendectomy, Hx Bowel Surgery - umbilical 

hernia, Hx Hysterectomy, Hx Orthopedic Surgery - Right knee replaced, spinal 

fusion, Hx Tonsillectomy





- Immunizations


Hx Diphtheria, Pertussis, Tetanus Vaccination: Yes





Review of Systems





- Review of Systems


Constitutional: Other - Dizziness





Physical Exam





- Vital signs


Vitals: 





 











Temp Pulse Resp BP Pulse Ox


 


 98.4 F   77   18   176/57 H  95 


 


 05/09/18 15:31  05/09/18 15:31  05/09/18 15:31  05/09/18 15:31  05/09/18 15:31














- General


General appearance: Appears well


In distress: None





Course





- Vital Signs


Vital signs: 





 











Temp Pulse Resp BP Pulse Ox


 


 98.4 F   77   18   176/57 H  95 


 


 05/09/18 15:31  05/09/18 15:31  05/09/18 15:31  05/09/18 15:31  05/09/18 15:31

## 2018-05-09 NOTE — RADIOLOGY REPORT (SQ)
EXAM DESCRIPTION:  CT HEAD WITHOUT



COMPLETED DATE/TIME:  5/9/2018 4:11 pm



REASON FOR STUDY:  dizzy



COMPARISON:  None.



TECHNIQUE:  Axial images acquired through the brain without intravenous contrast.  Images reviewed wi
th bone, brain and subdural windows.   Images stored on PACS.

All CT scanners at this facility use dose modulation, iterative reconstruction, and/or weight based d
osing when appropriate to reduce radiation dose to as low as reasonably achievable (ALARA).

[facility]: Dose Right  [facility]: CareDose    [facility]: Dose Right    [facility]: Teradose 4D    [facility]: Smart Technologies



RADIATION DOSE:  CT Rad equipment meets quality standard of care and radiation dose reduction techniq
ues were employed. CTDIvol: 53.2 mGy. DLP: 1070 mGy-cm.mGy.



LIMITATIONS:  None.



FINDINGS:  VENTRICLES: Prominent.

CEREBRUM: No masses.  No hemorrhage.  No midline shift.  Areas of low density in the white matter mos
t likely due to chronic micro-vascular ischemic change.  No evidence for acute infarction.

CEREBELLUM: No masses.  No hemorrhage.  No alteration of density.  No evidence for acute infarction.

EXTRAAXIAL SPACES: Age-related involutional change.  No fluid collections.  No masses.

ORBITS AND GLOBE: No intra- or extraconal masses.  Normal contour of globe without masses.

CALVARIUM: No fracture.

PARANASAL SINUSES: No fluid or mucosal thickening.

SOFT TISSUES: No mass or hematoma.

OTHER: No other significant finding.



IMPRESSION:  CHRONIC CHANGES OF ATROPHY AND MICROVASCULAR ISCHEMIA.  NO ACUTE PROCESS.

EVIDENCE OF ACUTE STROKE: NO.



TECHNICAL DOCUMENTATION:  JOB ID:  [id number]

Quality ID # [id number]: Final reports with documentation of one or more dose reduction techniques (e.g., Au
tomated exposure control, adjustment of the mA and/or kV according to patient size, use of iterative 
reconstruction technique)

 2011 [employer]- All Rights Reserved



Reading location - IP/workstation name: [email or web address]

## 2018-05-09 NOTE — RADIOLOGY REPORT (SQ)
EXAM DESCRIPTION:  CHEST 2 VIEWS



COMPLETED DATE/TIME:  5/9/2018 4:23 pm



REASON FOR STUDY:  dizzy



COMPARISON:  4/29/2018



EXAM PARAMETERS:  NUMBER OF VIEWS: two views

TECHNIQUE: Digital Frontal and Lateral radiographic views of the chest acquired.

RADIATION DOSE: NA

LIMITATIONS: none



FINDINGS:  LUNGS AND PLEURA: No opacities, masses or pneumothorax. No pleural effusion.

MEDIASTINUM AND HILAR STRUCTURES: No masses or contour abnormalities.

HEART AND VASCULAR STRUCTURES: The configuration of the heart mediastinal structures is unchanged.  C
ardiac silhouette appears mildly enlarged.

BONES: No acute findings.

HARDWARE: None in the chest.

OTHER: No other significant finding.



IMPRESSION:  No significant interval changes compared to the previous study.  No acute findings.  Oth
er findings as noted above



TECHNICAL DOCUMENTATION:  JOB ID:  [id number]

 2011 [employer]- All Rights Reserved



Reading location - IP/workstation name: [email or web address]

## 2018-05-09 NOTE — ER DOCUMENT REPORT
ED Dizziness/Weakness





<HARICAMERON - Last Filed: 05/09/18 20:46>





- General


Mode of Arrival: Ambulatory


Information source: Patient


TRAVEL OUTSIDE OF THE [place] IN LAST 30 DAYS: No





<NICOLE MENDENHALL - Last Filed: 05/09/18 21:52>





- General


Chief Complaint: Dizziness


Stated Complaint: DIZZINESS


Time Seen by Provider: 05/09/18 15:41


Notes: 


Patient is an 82 year old female with a history of colon cancer, diabetes, 

renal insufficiency, COPD, hypertension, A-fib,hyperlipidemia, and 

hyperthyroidism presents to the emergency department complaining of dizziness 

onset 1 week ago. Patient states her dizziness is exacerbated with rapid head 

movement and standing up. She further states she was prescribed Meclizine (25 

mg 3x daily) from her PCP 2 days ago and is unsure if she took her dosage this 

morning due to being unable to swallow her pills. There is approximately 5 

tablets missing from her Meclizine bottle. 





Patient was seen in this emergency department on 4/29/2018 and diagnosed with 

colitis subsequently being prescribed Pepcid and Zofran. 





Patient is also prescribed Eliquis, Digoxin and Metoprolol.   (NICOLE MENDENHALL)





- Related Data


Allergies/Adverse Reactions: 


 





IVP dye Allergy (Uncoded 05/09/18 15:39)


 











Past Medical History





- General


Information source: Patient





- Social History


Smoking Status: Never Smoker


Chew tobacco use (# tins/day): No


Frequency of alcohol use: None


Drug Abuse: None


Family History: Reviewed & Not Pertinent


Patient has suicidal ideation: No


Patient has homicidal ideation: No





- Past Medical History


Cardiac Medical History: Reports: Hx Atrial Fibrillation, Hx Hypertension


Pulmonary Medical History: Reports: Hx COPD


Endocrine Medical History: Reports: Hx Diabetes Mellitus Type 2


Malignancy Medical History: Reports: Hx Colorectal Cancer


Musculoskeltal Medical History: Reports Hx Arthritis - in pain management


Past Surgical History: Reports: Hx Appendectomy, Hx Bowel Surgery - umbilical 

hernia, Hx Hysterectomy, Hx Orthopedic Surgery - Right knee replaced, spinal 

fusion of the L5-S1., Hx Tonsillectomy





- Immunizations


Hx Diphtheria, Pertussis, Tetanus Vaccination: Yes





<NICOLE MENDENHALL - Last Filed: 05/09/18 21:52>





Review of Systems





- Review of Systems


Constitutional: No symptoms reported


EENT: No symptoms reported


Cardiovascular: See HPI, Dizziness


Respiratory: No symptoms reported


Gastrointestinal: No symptoms reported


Genitourinary: No symptoms reported


Female Genitourinary: No symptoms reported


Musculoskeletal: No symptoms reported


Skin: No symptoms reported


Hematologic/Lymphatic: No symptoms reported


Neurological/Psychological: No symptoms reported


-: Yes All other systems reviewed and negative





<NICOLE MENDENHALL - Last Filed: 05/09/18 21:52>





Physical Exam





<HARI,CAMERON - Last Filed: 05/09/18 20:46>





<NICOLE MENDENHALL - Last Filed: 05/09/18 21:52>





- Vital signs


Vitals: 


 











Temp Pulse Resp BP Pulse Ox


 


 98.4 F   77   18   176/57 H  95 


 


 05/09/18 15:31  05/09/18 15:31  05/09/18 15:31  05/09/18 15:31  05/09/18 15:31














- Notes


Notes: 


GENERAL: Alert, interacts well. No acute distress.


HEAD: Normocephalic, atraumatic.


EYES: Minimal lateral gaze nystagmus provoked with movement of head to the left 

and right. Pupils equal, round, and reactive to light. Extraocular movements 

intact.


ENT: Oral mucosa moist, tongue midline. 


NECK: Full range of motion. Supple. Trachea midline. No bruits.


LUNGS: Clear to auscultation bilaterally, no wheezes, rales, or rhonchi. No 

respiratory distress.


HEART: Regular rate and rhythm. No murmurs, gallops, or rubs.


ABDOMEN: Soft, non-tender. Obese. Non-distended. Bowel sounds present in all 4 

quadrants.


EXTREMITIES: BLE tender to palpaiton. Moves all 4 extremities spontaneously. No 

edema, strong radial and dorsalis pedis pulses. No cyanosis.


NEUROLOGICAL: Alert and oriented x3. Normal speech. Patient appears demented.


PSYCH: Normal affect, normal mood.


SKIN: Warm, dry, normal turgor. No rashes or lesions noted.





 (ZANICOLE)





Course





- Laboratory


Result Diagrams: 


 05/09/18 15:50





 05/09/18 15:50





- Diagnostic Test


Radiology reviewed: Reports reviewed - MRI of the brain does not show evidence 

of stroke





- EKG Interpretation by Me


EKG shows normal: Axis, Intervals, QRS Complexes, ST-T Waves


Rate: Normal - 80


Rhythm: A.Fib


When compared to previous EKG there are: No significant change





<CAMERON NORIEGA - Last Filed: 05/09/18 20:46>





- Laboratory


Result Diagrams: 


 05/09/18 15:50





 05/09/18 15:50





<NICOLE MENDENHALL - Last Filed: 05/09/18 21:52>





- Re-evaluation


Re-evalutation: 





05/09/18 20:47


After the MRI, I had the patient sit up and look about left right up and down 

rapidly, she states that the dizziness is actually much better now after 

receiving the 50 mg of meclizine. (CAMERON NORIEGA)





- Vital Signs


Vital signs: 


 











Temp Pulse Resp BP Pulse Ox


 


 98.0 F   88   16   153/77 H  96 


 


 05/09/18 21:01  05/09/18 21:01  05/09/18 21:01  05/09/18 21:01  05/09/18 21:01














- Laboratory


Laboratory results interpreted by me: 


 











  05/09/18 05/09/18 05/09/18





  15:15 15:50 15:50


 


WBC   13.3 H 


 


Hgb   11.0 L 


 


Hct   33.5 L 


 


RDW   16.2 H 


 


Seg Neutrophils %   80.0 H 


 


Lymphocytes %   10.1 L 


 


Absolute Neutrophils   10.6 H 


 


Sodium    145.8 H


 


Carbon Dioxide    33 H


 


BUN    26 H


 


Creatinine    1.55 H


 


Est GFR ( Amer)    39 L


 


Est GFR (Non-Af Amer)    32 L


 


Glucose    129 H


 


Direct Bilirubin    0.5 H


 


Urine Protein  100 H  


 


Urine Blood  SMALL H  














Discharge





<CAMERON NORIEGA - Last Filed: 05/09/18 20:46>





<NICOLE MENDENHALL - Last Filed: 05/09/18 21:52>





- Discharge


Clinical Impression: 


 Vertigo, Chronic atrial fibrillation





High blood pressure


Qualifiers:


 Hypertension type: essential hypertension Qualified Code(s): I10 - Essential (

primary) hypertension





Condition: Stable


Disposition: HOME, SELF-CARE


Additional Instructions: 


Vertigo:





     You have experienced an episode of vertigo -- a whirling dizziness which 

may be accompanied by nausea and vomiting or staggering.  Vertigo is often 

caused by an irritation of the inner ear, in which case it is called 

labyrinthitis.  It can also be a symptom of a degenerating inner ear, nerve 

damage, or brain injury.  Your physician has evaluated you to determine whether 

any further testing is necessary.


     Vertigo is often treated with dramamine or meclizine.  These medications 

are helpful, but stronger medication may be needed if you are vomiting.  Rest 

in bed.  You should not drive or operate machinery until completely better.  It 

may take one to three weeks for recovery.


     If there are new symptoms, such as decreased hearing or vision, severe 

headache, weakness or faintness, or confusion, call the physician.





********************************************************************************

***********





Increase your meclizine dose to 50 mg(2 tablets) every 8 hours for dizziness as 

needed.


Take fall precautions as long as you are feeling dizzy.


Be sure to take all of your regular medications as prescribed.


Your blood pressure was quite high this afternoon.  Be sure you are taking all 

of your blood pressure medication.


Check your blood pressure throughout the day and if it remains elevated then 

follow-up with your primary care provider for medication adjustment.


Follow-up with your doctor if your dizziness is not improving over the next few 

days.





RETURN TO THE EMERGENCY ROOM IF ANY NEW OR WORSENING SYMPTOMS.








Referrals: 


JEREL ROPER PA-C [Primary Care Provider] - Follow up as needed


Scribe Attestation: 





05/09/18 19:28


I personally performed the services described in the documentation, reviewed 

and edited the documentation which was dictated to the scribe in my presence, 

and it accurately records my words and actions. (CAMERON NORIEGA)





Scribe Documentation





- Scribe


Written by Sunil:: Sunil Desai, 5/9/2018 18:47


acting as scribe for :: Hari





<NICOLE MENDENHALL - Last Filed: 05/09/18 21:52>

## 2018-11-26 ENCOUNTER — HOSPITAL ENCOUNTER (OUTPATIENT)
Dept: HOSPITAL 62 - RAD | Age: 83
End: 2018-11-26
Attending: INTERNAL MEDICINE
Payer: MEDICARE

## 2018-11-26 DIAGNOSIS — N18.4: ICD-10-CM

## 2018-11-26 DIAGNOSIS — E11.22: Primary | ICD-10-CM

## 2018-11-26 DIAGNOSIS — I65.23: ICD-10-CM

## 2018-11-26 DIAGNOSIS — I12.9: ICD-10-CM

## 2018-11-26 LAB
ADD MANUAL DIFF: NO
ALBUMIN SERPL-MCNC: 3.7 G/DL (ref 3.5–5)
ALP SERPL-CCNC: 75 U/L (ref 38–126)
ALT SERPL-CCNC: 14 U/L (ref 9–52)
ANION GAP SERPL CALC-SCNC: 12 MMOL/L (ref 5–19)
APPEARANCE UR: CLEAR
APTT PPP: YELLOW S
AST SERPL-CCNC: 11 U/L (ref 14–36)
BASOPHILS # BLD AUTO: 0.1 10^3/UL (ref 0–0.2)
BASOPHILS NFR BLD AUTO: 0.5 % (ref 0–2)
BILIRUB DIRECT SERPL-MCNC: 0.2 MG/DL (ref 0–0.4)
BILIRUB SERPL-MCNC: 0.4 MG/DL (ref 0.2–1.3)
BILIRUB UR QL STRIP: NEGATIVE
BUN SERPL-MCNC: 40 MG/DL (ref 7–20)
CALCIUM: 9.3 MG/DL (ref 8.4–10.2)
CHLORIDE SERPL-SCNC: 102 MMOL/L (ref 98–107)
CK SERPL-CCNC: 33 U/L (ref 30–135)
CO2 SERPL-SCNC: 30 MMOL/L (ref 22–30)
EOSINOPHIL # BLD AUTO: 0.5 10^3/UL (ref 0–0.6)
EOSINOPHIL NFR BLD AUTO: 4.2 % (ref 0–6)
ERYTHROCYTE [DISTWIDTH] IN BLOOD BY AUTOMATED COUNT: 14.5 % (ref 11.5–14)
GLUCOSE SERPL-MCNC: 106 MG/DL (ref 75–110)
GLUCOSE UR STRIP-MCNC: NEGATIVE MG/DL
HCT VFR BLD CALC: 29.4 % (ref 36–47)
HGB BLD-MCNC: 9.7 G/DL (ref 12–15.5)
KETONES UR STRIP-MCNC: NEGATIVE MG/DL
LYMPHOCYTES # BLD AUTO: 1.4 10^3/UL (ref 0.5–4.7)
LYMPHOCYTES NFR BLD AUTO: 11 % (ref 13–45)
MCH RBC QN AUTO: 29.9 PG (ref 27–33.4)
MCHC RBC AUTO-ENTMCNC: 33.2 G/DL (ref 32–36)
MCV RBC AUTO: 90 FL (ref 80–97)
MONOCYTES # BLD AUTO: 1 10^3/UL (ref 0.1–1.4)
MONOCYTES NFR BLD AUTO: 7.9 % (ref 3–13)
NEUTROPHILS # BLD AUTO: 9.5 10^3/UL (ref 1.7–8.2)
NEUTS SEG NFR BLD AUTO: 76.4 % (ref 42–78)
NITRITE UR QL STRIP: NEGATIVE
PH UR STRIP: 6 [PH] (ref 5–9)
PHOSPHATE SERPL-MCNC: 3.7 MG/DL (ref 2.5–4.5)
PLATELET # BLD: 272 10^3/UL (ref 150–450)
POTASSIUM SERPL-SCNC: 5.1 MMOL/L (ref 3.6–5)
PROT SERPL-MCNC: 6.5 G/DL (ref 6.3–8.2)
PROT UR STRIP-MCNC: 100 MG/DL
RBC # BLD AUTO: 3.26 10^6/UL (ref 3.72–5.28)
SODIUM SERPL-SCNC: 144 MMOL/L (ref 137–145)
SP GR UR STRIP: 1.01
TOTAL CELLS COUNTED % (AUTO): 100 %
UROBILINOGEN UR-MCNC: NEGATIVE MG/DL (ref ?–2)
WBC # BLD AUTO: 12.5 10^3/UL (ref 4–10.5)

## 2018-11-26 PROCEDURE — 80053 COMPREHEN METABOLIC PANEL: CPT

## 2018-11-26 PROCEDURE — 93880 EXTRACRANIAL BILAT STUDY: CPT

## 2018-11-26 PROCEDURE — 82550 ASSAY OF CK (CPK): CPT

## 2018-11-26 PROCEDURE — 76770 US EXAM ABDO BACK WALL COMP: CPT

## 2018-11-26 PROCEDURE — 85025 COMPLETE CBC W/AUTO DIFF WBC: CPT

## 2018-11-26 PROCEDURE — 83970 ASSAY OF PARATHORMONE: CPT

## 2018-11-26 PROCEDURE — 84100 ASSAY OF PHOSPHORUS: CPT

## 2018-11-26 PROCEDURE — 81001 URINALYSIS AUTO W/SCOPE: CPT

## 2018-11-26 PROCEDURE — 36415 COLL VENOUS BLD VENIPUNCTURE: CPT

## 2018-11-26 NOTE — RADIOLOGY REPORT (SQ)
EXAM DESCRIPTION:  CAROTID DOPPLER



COMPLETED DATE/TIME:  11/26/2018 3:33 pm



REASON FOR STUDY:  OCCLUSION AND STENOSIS OF BILATERAL ARTERIES N18.4  CHRONIC KIDNEY DISEASE, STAGE 
4 (SEVERE) I12.9  HYPERTENSIVE CHRONIC KIDNEY DISEASE W STG 1-4/UNSP CHR I65.23  OCCLUSION AND STENOS
IS OF BILATERAL CAROTID ARTERIES



COMPARISON:  MRI brain 5/9/2018

CT brain 5/9/2018



TECHNIQUE:  Grayscale ultrasound, Doppler velocity and spectra, and color Doppler images acquired of 
the extra-cranial carotid and vertebral arteries. Images stored on PACS.



LIMITATIONS:  None.



FINDINGS:  RIGHT CAROTID

CCA Velocities: Within normal limits.  Right common carotid artery peak systolic velocity 0.87 m/sec.


ICA Velocities

 Peak systolic 1.13 m/s.

 End diastolic 0.17 m/s.

Proximal ICA/CCA peak systolic ratio 1.4.

The proximal right ICA is very tortuous, with mixed calcific and noncalcific plaque.  Velocities sugg
est against flow significant stenosis.

LEFT CAROTID

CCA Velocities: Within normal limits.  Left common carotid artery peak systolic velocity 1.1 m/sec

ICA Velocities

 Peak systolic 1.3 m/s.

 End diastolic 0.3 m/s.

Proximal ICA/CCA peak systolic ratio 1.2.

The proximal left ICA is very tortuous, with mixed calcific and noncalcific plaque.  Velocities sugge
st against flow significant stenosis.

VERTEBRAL ARTERIES: Antegrade flow.  Normal waveforms.

SUBCLAVIAN ARTERIES: Not evaluated

OTHER: No other significant finding.



IMPRESSION:  NO HEMODYNAMICALLY SIGNIFICANT STENOSIS.



COMMENT:  Quality ID #195:  Velocity criteria are extrapolated from the diameter data as defined by t
he Society of Radiologists in Ultrasound Consensus Conference. Radiology 2003: 229; 340-346.



TECHNICAL DOCUMENTATION:  JOB ID:  [id number]

 2011 [employer]- All Rights Reserved



Reading location - IP/workstation name: [email or web address]

## 2018-11-26 NOTE — RADIOLOGY REPORT (SQ)
EXAM DESCRIPTION:  U/S RETROPERITON (RENAL/AORTA)



COMPLETED DATE/TIME:  11/26/2018 2:22 pm



REASON FOR STUDY:  N18.4 CHRONIC KIDNEY DISEASE, STAGE 4 (SEVERE) N18.4  CHRONIC KIDNEY DISEASE, STAG
E 4 (SEVERE) I12.9  HYPERTENSIVE CHRONIC KIDNEY DISEASE W STG 1-4/UNSP CHR I65.23  OCCLUSION AND STEN
OSIS OF BILATERAL CAROTID ARTERIES



COMPARISON:  None.



TECHNIQUE:  Dynamic and static grayscale images acquired of the kidneys and bladder and recorded on P
ACS. Additional selected color Doppler and spectral images recorded.



LIMITATIONS:  None.



FINDINGS:  RIGHT KIDNEY: Normal size, 10 cm.  Multiple small cortical cysts.  The largest measures 2.
4 cm in largest dimension.  No solid masses.  No calcifications.  No hydronephrosis.  There is cortic
al thinning.

LEFT KIDNEY:  Normal size, 10.6 cm.  Multiple cysts.  The largest measures 2.6 cm in largest diameter
.  Cortical thinning is present.  No hydronephrosis.  No stones are seen.

BLADDER: Urinary bladder is normal.  Ureteral jets are not seen.

OTHER FINDINGS: No other significant finding.



IMPRESSION:  Atrophic changes are present in both kidneys with several cortical cysts.  The urinary b
ladder is normal.



TECHNICAL DOCUMENTATION:  JOB ID:  [id number]

 2011 [employer]- All Rights Reserved



Reading location - IP/workstation name: [email or web address]

## 2019-01-16 ENCOUNTER — HOSPITAL ENCOUNTER (EMERGENCY)
Dept: HOSPITAL 62 - ER | Age: 84
Discharge: HOME | End: 2019-01-16
Payer: MEDICARE

## 2019-01-16 VITALS — SYSTOLIC BLOOD PRESSURE: 181 MMHG | DIASTOLIC BLOOD PRESSURE: 68 MMHG

## 2019-01-16 DIAGNOSIS — L29.9: ICD-10-CM

## 2019-01-16 DIAGNOSIS — R21: ICD-10-CM

## 2019-01-16 DIAGNOSIS — J44.1: Primary | ICD-10-CM

## 2019-01-16 DIAGNOSIS — E11.9: ICD-10-CM

## 2019-01-16 DIAGNOSIS — Z99.81: ICD-10-CM

## 2019-01-16 DIAGNOSIS — R05: ICD-10-CM

## 2019-01-16 DIAGNOSIS — I10: ICD-10-CM

## 2019-01-16 PROCEDURE — 99283 EMERGENCY DEPT VISIT LOW MDM: CPT

## 2019-01-16 PROCEDURE — S0028 INJECTION, FAMOTIDINE, 20 MG: HCPCS

## 2019-01-16 PROCEDURE — 96374 THER/PROPH/DIAG INJ IV PUSH: CPT

## 2019-01-16 PROCEDURE — 94640 AIRWAY INHALATION TREATMENT: CPT

## 2019-01-16 NOTE — ER DOCUMENT REPORT
ED General





- General


Chief Complaint: Skin Problem


Stated Complaint: POSSIBLE RASH


Time Seen by Provider: 01/16/19 13:37


Mode of Arrival: Wheelchair


Information source: Patient


Notes: 





Patient is an 83-year-old female with history of COPD who presents with chief 

complaint of increased cough, increased sputum and wheezing.  She also reports 

excessive itching due to rash that is on her arms and legs.  States this is been

going on for several months.  Patient reports she is seen her primary care 

provider for the rash, he placed her on hydroxyzine which made her very sleepy. 

Patient has tried several different over-the-counter creams and ointments to the

area without relief.  Patient did take a short course of prednisone several 

weeks ago, she states this did not help her rash.  Patient denies any fevers, 

nausea, vomiting or diarrhea.  Patient does wear home O2 at 2 L at night.





TRAVEL OUTSIDE OF THE [place] IN LAST 30 DAYS: No





- Related Data


Allergies/Adverse Reactions: 


                                        





IVP dye Allergy (Uncoded 05/09/18 15:39)


   











Past Medical History





- General


Information source: Patient





- Social History


Smoking Status: Never Smoker


Frequency of alcohol use: None


Drug Abuse: None


Family History: Reviewed & Not Pertinent


Patient has suicidal ideation: No


Patient has homicidal ideation: No





- Past Medical History


Cardiac Medical History: Reports: Hx Atrial Fibrillation, Hx Hypertension


Pulmonary Medical History: Reports: Hx COPD


Endocrine Medical History: Reports: Hx Diabetes Mellitus Type 2


Renal/ Medical History: Denies: Hx Peritoneal Dialysis


Malignancy Medical History: Reports: Hx Colorectal Cancer


Musculoskeletal Medical History: Reports Hx Arthritis - in pain management


Past Surgical History: Reports: Hx Appendectomy, Hx Bowel Surgery - umbilical 

hernia, Hx Hysterectomy, Hx Orthopedic Surgery - Right knee replaced, spinal 

fusion of the L5-S1., Hx Tonsillectomy





- Immunizations


Hx Diphtheria, Pertussis, Tetanus Vaccination: Yes





Review of Systems





- Review of Systems


Constitutional: No symptoms reported


EENT: No symptoms reported


Cardiovascular: No symptoms reported


Respiratory: Cough, Sputum, Wheezing


Gastrointestinal: No symptoms reported


Genitourinary: No symptoms reported


Female Genitourinary: No symptoms reported


Musculoskeletal: No symptoms reported


Skin: Rash


Hematologic/Lymphatic: No symptoms reported


Neurological/Psychological: No symptoms reported





Physical Exam





- Vital signs


Vitals: 


                                        











Resp Pulse Ox


 


 21 H  96 


 


 01/16/19 13:21  01/16/19 13:21














- Notes


Notes: 





PHYSICAL EXAMINATION:





GENERAL: Elderly female, well-nourished and in no acute distress.





HEAD: Atraumatic, normocephalic.





EYES: Pupils equal round and reactive to light, extraocular movements intact, 

conjunctiva are normal.





ENT: Nares patent, oropharynx clear without exudates.  Moist mucous membranes.





NECK: Normal range of motion, supple without lymphadenopathy





LUNGS: Breath sounds clear to auscultation bilaterally and equal.  Moderate 

expiratory wheezing noted bilaterally.





HEART: Regular rate and rhythm without murmurs





ABDOMEN: Soft, nontender, nondistended abdomen.  No guarding, no rebound.  No 

masses appreciated.





Female : deferred





Musculoskeletal: Normal range of motion, no pitting or edema.  No cyanosis.





NEUROLOGICAL: Cranial nerves grossly intact.  Normal speech, normal gait.  

Normal sensory, motor exams





PSYCH: Normal mood, normal affect.





SKIN: Warm, Dry, normal turgor, scattered erythematous rash to bilateral legs 

and bilateral upper extremities, multiple superficial abrasions consistent with 

excessive scratching.





Course





- Re-evaluation


Re-evalutation: 





Patient reports continued pruritus after being diagnosed with eczema by her 

primary care provider.  Patient reports trying multiple over-the-counter creams 

without relief.  She does state that her primary care provider ordered her some 

type of prescription cream that had to be compounded at a pharmacy and [place] 

however she states she was unable to afford the medication.  Patient does have 

COPD exacerbation currently ongoing.  Patient reports increased sputum volume, 

increased purulence and increased wheezing and shortness of breath.  Patient 

adamantly refusing chest x-ray she states she has had "too many x-rays in her 

lifetime".  I did explain to patient that the only way for me to evaluate for 

pneumonia is to perform a chest x-ray.  Patient reports she has not had a fever 

so she does not feel that she has pneumonia.





Lung sounds improved after breathing treatments administered in the emergency 

department.  Patient is a diabetic so will hold off on administering steroids as

patient's breathing has improved significantly after breathing treatments.  

Patient reports she has daily inhaled corticosteroid inhalers at home as well as

rescue inhaler.  Patient will be prescribed topical triamcinolone cream as she 

states this has worked for her in the past.  My attending physician did 

recommend clobetasol cream however this is not covered by patient's insurance.  

Patient will be placed on a course of steroids due to several week history of 

increased sputum production, increased purulence in the presence of COPD.  

Instructed patient to have close follow-up with her primary care provider.  

Patient and family member at bedside verbalized understanding.











- Vital Signs


Vital signs: 


                                        











Temp Pulse Resp BP Pulse Ox


 


 98.4 F      14   181/68 H  96 


 


 01/16/19 13:26     01/16/19 14:00  01/16/19 14:00  01/16/19 14:00














Discharge





- Discharge


Clinical Impression: 


 COPD exacerbation, Pruritus





Condition: Stable


Disposition: HOME, SELF-CARE


Additional Instructions: 


Chronic Obstructive Lung Disease





     You have chronic obstructive lung disease (COPD).  The symptoms come from 

emphysema (damage to small airways, with trapping of air in large sacks in the 

lung) and chronic bronchitis (repeated infection and damage to larger airways). 

The cause is almost always cigarette smoking, although dust exposure, asthma, 

and infections contribute.


     You should avoid fumes, dust, and smoke (especially tobacco smoke).  Your 

condition will flare from time to time.  There is no cure, but the symptoms can 

be treated.


     Bronchodilators (asthma medicine) are often helpful.  Antibiotics help when

infection is present.  When shortness of breath is severe, we may prescribe 

cortisone medication.  If medicine doesn't help enough, we can arrange for you 

to have an oxygen tank at home.


     Notify your doctor at once if sputum becomes thick, foul, or bloody, if you

develop a fever or chest pain, or if your shortness of breath worsens.





Itching, NonSpecific





     Itching can be a symptom of both skin problems or internal diseases. Most 

of the time, itching is simply a nuisance, and doesn't mean there's any serious 

underlying problem. If there are no symptoms of an ongoing medical condition, we

simply prescribe anti-itch medicine to relieve symptoms.


     Itching can be due to skin allergic reactions such as poison oak, poison 

ivy, dermatitis, or jewelry allergy. It can be caused by dry skin, or skin 

that's been stretched by pregnancy, weight gain, or water retention. It can be 

caused by skin parasites such as scabies, or by bug bites.


     Itching can be caused by internal allergy, such as food allergy, or 

medication allergy, or intestinal parasites. It can accompany liver disease.


     Apply a non-perfumed ointment like Vaseline, Eucerin lotion, Nutraderm, or 

Lubriderm if your skin is dry. Apply frequently, especially after bathing.


     Benadryl, Hydroxyzine, Doxepin and other anti-itch medicines can help 

control the urge to scratch.


     Hydrocortisone cream, or a prescription steroid cream or ointment can help 

reduce inflammation.


     Avoid hot baths or showers. Use as little soap as possible while bathing. 

Don't scrub your skin unless the doctor has specifically told you to do this.


     Call the doctor or return if there are signs of infection, fever, pain, or 

if symptoms become severe.





****Please use the cream that I have prescribed for your rash and itching.  Do 

not apply anything else to the rash.  Take the medications as prescribed for 

your COPD exacerbation as well as your itching.  Follow-up with your primary 

care provider, call them tomorrow to schedule an appointment.****





Prescriptions: 


Doxycycline Hyclate 100 mg PO BID #14 capsule


Famotidine 40 mg PO BID #14 tablet


Triamcinolone Acetonide [Triderm] 454 gm TP BID #454 gm


Referrals: 


KEKE AGRAWAL MD [Primary Care Provider] - Follow up as needed

## 2019-01-18 ENCOUNTER — HOSPITAL ENCOUNTER (INPATIENT)
Dept: HOSPITAL 62 - ER | Age: 84
LOS: 4 days | Discharge: HOME | DRG: 291 | End: 2019-01-22
Attending: INTERNAL MEDICINE | Admitting: INTERNAL MEDICINE
Payer: MEDICARE

## 2019-01-18 DIAGNOSIS — I16.0: ICD-10-CM

## 2019-01-18 DIAGNOSIS — Z90.49: ICD-10-CM

## 2019-01-18 DIAGNOSIS — I27.20: ICD-10-CM

## 2019-01-18 DIAGNOSIS — I48.91: ICD-10-CM

## 2019-01-18 DIAGNOSIS — Z96.651: ICD-10-CM

## 2019-01-18 DIAGNOSIS — Z85.038: ICD-10-CM

## 2019-01-18 DIAGNOSIS — Z79.01: ICD-10-CM

## 2019-01-18 DIAGNOSIS — I50.23: ICD-10-CM

## 2019-01-18 DIAGNOSIS — K21.9: ICD-10-CM

## 2019-01-18 DIAGNOSIS — Z99.81: ICD-10-CM

## 2019-01-18 DIAGNOSIS — Z91.041: ICD-10-CM

## 2019-01-18 DIAGNOSIS — M19.90: ICD-10-CM

## 2019-01-18 DIAGNOSIS — I13.0: Primary | ICD-10-CM

## 2019-01-18 DIAGNOSIS — N18.3: ICD-10-CM

## 2019-01-18 DIAGNOSIS — Z87.891: ICD-10-CM

## 2019-01-18 DIAGNOSIS — D63.1: ICD-10-CM

## 2019-01-18 DIAGNOSIS — J96.21: ICD-10-CM

## 2019-01-18 DIAGNOSIS — E87.5: ICD-10-CM

## 2019-01-18 DIAGNOSIS — E11.22: ICD-10-CM

## 2019-01-18 DIAGNOSIS — J44.1: ICD-10-CM

## 2019-01-18 DIAGNOSIS — Z79.4: ICD-10-CM

## 2019-01-18 DIAGNOSIS — Z82.49: ICD-10-CM

## 2019-01-18 DIAGNOSIS — Z90.710: ICD-10-CM

## 2019-01-18 DIAGNOSIS — T46.5X5A: ICD-10-CM

## 2019-01-18 DIAGNOSIS — Z79.899: ICD-10-CM

## 2019-01-18 DIAGNOSIS — I51.7: ICD-10-CM

## 2019-01-18 LAB
ABSOLUTE RETICS #: 0.11 10^6/UL (ref 0.03–0.12)
ADD MANUAL DIFF: NO
ALBUMIN SERPL-MCNC: 3.7 G/DL (ref 3.5–5)
ALBUMIN SERPL-MCNC: 3.8 G/DL (ref 3.5–5)
ALP SERPL-CCNC: 88 U/L (ref 38–126)
ALP SERPL-CCNC: 91 U/L (ref 38–126)
ALT SERPL-CCNC: 10 U/L (ref 9–52)
ALT SERPL-CCNC: 9 U/L (ref 9–52)
ANION GAP SERPL CALC-SCNC: 11 MMOL/L (ref 5–19)
ANION GAP SERPL CALC-SCNC: 7 MMOL/L (ref 5–19)
APPEARANCE UR: CLEAR
APTT PPP: YELLOW S
AST SERPL-CCNC: 19 U/L (ref 14–36)
AST SERPL-CCNC: 26 U/L (ref 14–36)
BASOPHILS # BLD AUTO: 0.2 10^3/UL (ref 0–0.2)
BASOPHILS NFR BLD AUTO: 1.3 % (ref 0–2)
BILIRUB DIRECT SERPL-MCNC: 0.2 MG/DL (ref 0–0.4)
BILIRUB DIRECT SERPL-MCNC: 0.3 MG/DL (ref 0–0.4)
BILIRUB SERPL-MCNC: 0.6 MG/DL (ref 0.2–1.3)
BILIRUB SERPL-MCNC: 0.6 MG/DL (ref 0.2–1.3)
BILIRUB UR QL STRIP: NEGATIVE
BUN SERPL-MCNC: 50 MG/DL (ref 7–20)
BUN SERPL-MCNC: 51 MG/DL (ref 7–20)
CALCIUM: 8.5 MG/DL (ref 8.4–10.2)
CALCIUM: 9 MG/DL (ref 8.4–10.2)
CHLORIDE SERPL-SCNC: 102 MMOL/L (ref 98–107)
CHLORIDE SERPL-SCNC: 108 MMOL/L (ref 98–107)
CK MB SERPL-MCNC: 1.63 NG/ML (ref ?–4.55)
CK MB SERPL-MCNC: 1.84 NG/ML (ref ?–4.55)
CK MB SERPL-MCNC: 2.11 NG/ML (ref ?–4.55)
CO2 SERPL-SCNC: 25 MMOL/L (ref 22–30)
CO2 SERPL-SCNC: 30 MMOL/L (ref 22–30)
EOSINOPHIL # BLD AUTO: 0.7 10^3/UL (ref 0–0.6)
EOSINOPHIL NFR BLD AUTO: 4.8 % (ref 0–6)
ERYTHROCYTE [DISTWIDTH] IN BLOOD BY AUTOMATED COUNT: 16.4 % (ref 11.5–14)
FERRITIN SERPL-MCNC: 193 NG/ML (ref 11.1–264)
FOLATE SERPL-MCNC: 8.48 NG/ML (ref 2.76–?)
GLUCOSE SERPL-MCNC: 183 MG/DL (ref 75–110)
GLUCOSE SERPL-MCNC: 408 MG/DL (ref 75–110)
GLUCOSE UR STRIP-MCNC: NEGATIVE MG/DL
HCT VFR BLD CALC: 27.9 % (ref 36–47)
HGB BLD-MCNC: 8.9 G/DL (ref 12–15.5)
INR PPP: 1.07
IRON(TIBC): 34.5 UG/DL (ref 37–170)
KETONES UR STRIP-MCNC: NEGATIVE MG/DL
LYMPHOCYTES # BLD AUTO: 1.2 10^3/UL (ref 0.5–4.7)
LYMPHOCYTES NFR BLD AUTO: 8.5 % (ref 13–45)
MCH RBC QN AUTO: 28.9 PG (ref 27–33.4)
MCHC RBC AUTO-ENTMCNC: 32.1 G/DL (ref 32–36)
MCV RBC AUTO: 90 FL (ref 80–97)
MONOCYTES # BLD AUTO: 0.9 10^3/UL (ref 0.1–1.4)
MONOCYTES NFR BLD AUTO: 6.2 % (ref 3–13)
NEUTROPHILS # BLD AUTO: 11.5 10^3/UL (ref 1.7–8.2)
NEUTS SEG NFR BLD AUTO: 79.2 % (ref 42–78)
NITRITE UR QL STRIP: NEGATIVE
PH UR STRIP: 5 [PH] (ref 5–9)
PLATELET # BLD: 268 10^3/UL (ref 150–450)
POTASSIUM SERPL-SCNC: 5.7 MMOL/L (ref 3.6–5)
POTASSIUM SERPL-SCNC: 6.1 MMOL/L (ref 3.6–5)
PROT SERPL-MCNC: 7.2 G/DL (ref 6.3–8.2)
PROT SERPL-MCNC: 7.3 G/DL (ref 6.3–8.2)
PROT UR STRIP-MCNC: 100 MG/DL
PROTHROMBIN TIME: 14.5 SEC (ref 11.4–15.4)
RBC # BLD AUTO: 3.1 10^6/UL (ref 3.72–5.28)
RETICULOCYTE COUNT (AUTO): 3.41 % (ref 0.66–2.85)
SODIUM SERPL-SCNC: 138.4 MMOL/L (ref 137–145)
SODIUM SERPL-SCNC: 145 MMOL/L (ref 137–145)
SP GR UR STRIP: 1.01
TOTAL CELLS COUNTED % (AUTO): 100 %
TROPONIN I SERPL-MCNC: 0.02 NG/ML
TROPONIN I SERPL-MCNC: 0.03 NG/ML
TROPONIN I SERPL-MCNC: 0.04 NG/ML
UROBILINOGEN UR-MCNC: NEGATIVE MG/DL (ref ?–2)
VIT B12 SERPL-MCNC: 673 PG/ML (ref 239–931)
WBC # BLD AUTO: 14.5 10^3/UL (ref 4–10.5)

## 2019-01-18 PROCEDURE — 85027 COMPLETE CBC AUTOMATED: CPT

## 2019-01-18 PROCEDURE — 94668 MNPJ CHEST WALL SBSQ: CPT

## 2019-01-18 PROCEDURE — 83550 IRON BINDING TEST: CPT

## 2019-01-18 PROCEDURE — 83540 ASSAY OF IRON: CPT

## 2019-01-18 PROCEDURE — 86901 BLOOD TYPING SEROLOGIC RH(D): CPT

## 2019-01-18 PROCEDURE — P9016 RBC LEUKOCYTES REDUCED: HCPCS

## 2019-01-18 PROCEDURE — 80069 RENAL FUNCTION PANEL: CPT

## 2019-01-18 PROCEDURE — 87070 CULTURE OTHR SPECIMN AEROBIC: CPT

## 2019-01-18 PROCEDURE — 94640 AIRWAY INHALATION TREATMENT: CPT

## 2019-01-18 PROCEDURE — 81001 URINALYSIS AUTO W/SCOPE: CPT

## 2019-01-18 PROCEDURE — 82553 CREATINE MB FRACTION: CPT

## 2019-01-18 PROCEDURE — 85045 AUTOMATED RETICULOCYTE COUNT: CPT

## 2019-01-18 PROCEDURE — 94660 CPAP INITIATION&MGMT: CPT

## 2019-01-18 PROCEDURE — 86920 COMPATIBILITY TEST SPIN: CPT

## 2019-01-18 PROCEDURE — 82962 GLUCOSE BLOOD TEST: CPT

## 2019-01-18 PROCEDURE — 93010 ELECTROCARDIOGRAM REPORT: CPT

## 2019-01-18 PROCEDURE — 99291 CRITICAL CARE FIRST HOUR: CPT

## 2019-01-18 PROCEDURE — 36415 COLL VENOUS BLD VENIPUNCTURE: CPT

## 2019-01-18 PROCEDURE — 84484 ASSAY OF TROPONIN QUANT: CPT

## 2019-01-18 PROCEDURE — 80053 COMPREHEN METABOLIC PANEL: CPT

## 2019-01-18 PROCEDURE — 83605 ASSAY OF LACTIC ACID: CPT

## 2019-01-18 PROCEDURE — 82746 ASSAY OF FOLIC ACID SERUM: CPT

## 2019-01-18 PROCEDURE — 84132 ASSAY OF SERUM POTASSIUM: CPT

## 2019-01-18 PROCEDURE — 94799 UNLISTED PULMONARY SVC/PX: CPT

## 2019-01-18 PROCEDURE — 83735 ASSAY OF MAGNESIUM: CPT

## 2019-01-18 PROCEDURE — 93005 ELECTROCARDIOGRAM TRACING: CPT

## 2019-01-18 PROCEDURE — 86900 BLOOD TYPING SEROLOGIC ABO: CPT

## 2019-01-18 PROCEDURE — 36430 TRANSFUSION BLD/BLD COMPNT: CPT

## 2019-01-18 PROCEDURE — 5A09457 ASSISTANCE WITH RESPIRATORY VENTILATION, 24-96 CONSECUTIVE HOURS, CONTINUOUS POSITIVE AIRWAY PRESSURE: ICD-10-PCS | Performed by: INTERNAL MEDICINE

## 2019-01-18 PROCEDURE — 94667 MNPJ CHEST WALL 1ST: CPT

## 2019-01-18 PROCEDURE — 80048 BASIC METABOLIC PNL TOTAL CA: CPT

## 2019-01-18 PROCEDURE — 71045 X-RAY EXAM CHEST 1 VIEW: CPT

## 2019-01-18 PROCEDURE — 93306 TTE W/DOPPLER COMPLETE: CPT

## 2019-01-18 PROCEDURE — 82607 VITAMIN B-12: CPT

## 2019-01-18 PROCEDURE — 85610 PROTHROMBIN TIME: CPT

## 2019-01-18 PROCEDURE — 82550 ASSAY OF CK (CPK): CPT

## 2019-01-18 PROCEDURE — 82728 ASSAY OF FERRITIN: CPT

## 2019-01-18 PROCEDURE — 96374 THER/PROPH/DIAG INJ IV PUSH: CPT

## 2019-01-18 PROCEDURE — 87040 BLOOD CULTURE FOR BACTERIA: CPT

## 2019-01-18 PROCEDURE — 87205 SMEAR GRAM STAIN: CPT

## 2019-01-18 PROCEDURE — 86850 RBC ANTIBODY SCREEN: CPT

## 2019-01-18 PROCEDURE — 83880 ASSAY OF NATRIURETIC PEPTIDE: CPT

## 2019-01-18 PROCEDURE — 93971 EXTREMITY STUDY: CPT

## 2019-01-18 PROCEDURE — 85025 COMPLETE CBC W/AUTO DIFF WBC: CPT

## 2019-01-18 PROCEDURE — 83036 HEMOGLOBIN GLYCOSYLATED A1C: CPT

## 2019-01-18 RX ADMIN — FLUTICASONE PROPIONATE SCH SPRAYS: 50 SPRAY, METERED NASAL at 21:16

## 2019-01-18 RX ADMIN — APIXABAN SCH MG: 2.5 TABLET, FILM COATED ORAL at 21:15

## 2019-01-18 RX ADMIN — LEVALBUTEROL HYDROCHLORIDE PRN MG: 1.25 SOLUTION RESPIRATORY (INHALATION) at 17:15

## 2019-01-18 RX ADMIN — DILTIAZEM HYDROCHLORIDE SCH MG: 120 CAPSULE, COATED, EXTENDED RELEASE ORAL at 14:27

## 2019-01-18 RX ADMIN — Medication SCH ML: at 14:28

## 2019-01-18 RX ADMIN — INSULIN GLARGINE SCH UNIT: 100 INJECTION, SOLUTION SUBCUTANEOUS at 14:26

## 2019-01-18 RX ADMIN — INSULIN HUMAN PRN UNIT: 100 INJECTION, SOLUTION PARENTERAL at 22:04

## 2019-01-18 RX ADMIN — Medication SCH ML: at 21:18

## 2019-01-18 RX ADMIN — FUROSEMIDE SCH MG: 10 INJECTION, SOLUTION INTRAMUSCULAR; INTRAVENOUS at 21:16

## 2019-01-18 RX ADMIN — INSULIN HUMAN PRN UNIT: 100 INJECTION, SOLUTION PARENTERAL at 14:26

## 2019-01-18 RX ADMIN — PIPERACILLIN AND TAZOBACTAM SCH MLS/HR: 3; .375 INJECTION, POWDER, LYOPHILIZED, FOR SOLUTION INTRAVENOUS; PARENTERAL at 16:25

## 2019-01-18 RX ADMIN — METHYLPREDNISOLONE SODIUM SUCCINATE SCH MG: 40 INJECTION, POWDER, FOR SOLUTION INTRAMUSCULAR; INTRAVENOUS at 21:18

## 2019-01-18 RX ADMIN — SIMVASTATIN SCH MG: 40 TABLET, FILM COATED ORAL at 21:15

## 2019-01-18 NOTE — PDOC H&P
History of Present Illness


Admission Date/PCP: 


  01/18/19 06:26





  JEREL ROEPR PA-C





Patient complains of: Shortness of breath


History of Present Illness: 


ROBERT EVANGELISTA is a 83 year old female with an unclear past medical history 

of though presumed by medication regiment to include congestive heart failure, 

atrial fibrillation, CKD 3, oxygen dependent COPD, bronchitis and diabetes.  

Patient presents with 4 days of shortness of breath, rhinorrhea, sore throat and

GERD.  Evaluated in the emergency department 48 hours ago diagnosed with COPD 

exacerbation with bronchitis.  She was discharged home but continued to worsen 

prompting reevaluation the emergency room where she is found to have A. fib with

RVR, hypertensive urgency of 207/114, hyperkalemia, and cardiomegaly with 

pulmonary vascular congestion.  She receives several nebulizer treatments, felipe

tim on BiPAP and referred to the hospitalist for admission.  Patient is awake 

and alert on BiPAP but remains short of breath verifying the above.  He denies 

chest pain nausea vomiting





Past Medical History


Cardiac Medical History: Reports: Atrial Fibrillation, Hypertension


Pulmonary Medical History: Reports: Chronic Obstructive Pulmonary Disease (COPD)


Endocrine Medical History: Reports: Diabetes Mellitus Type 2


Malignancy Medical History: Reports: Colorectal Cancer


Musculoskeltal Medical History: Reports: Arthritis - in pain management





Past Surgical History


Past Surgical History: Reports: Appendectomy, Hysterectomy, Orthopedic Surgery -

Right knee replaced, spinal fusion of the L5-S1., Tonsillectomy





Social History


Information Source: Patient, [facility] Records


Lives with: Alone


Smoking Status: Former Smoker


Frequency of Alcohol Use: None


Drugs: None





- Advance Directive


Resuscitation Status: Full Code





Family History


Family History: Hypertension


Parental Family History Reviewed: Yes


Children Family History Reviewed: Yes


Sibling(s) Family History Reviewed.: Yes





Medication/Allergy


Home Medications: 








Digoxin [Lanoxin 0.125 mg Tablet] 0.125 mg PO DAILY 12/22/13 


Furosemide [Lasix] 40 mg PO DAILY 12/22/13 


Levothyroxine Sodium [Synthroid 0.112 mg Tablet] 0.112 mg PO DAILY 12/22/13 


Losartan Potassium 100 mg PO DAILY 12/22/13 


Metoprolol Tartrate [Lopressor 100 mg Tablet] 100 mg PO DAILY 12/22/13 


Morphine Sulfate [Morphine Sulfate ER] 60 mg PO BID 12/22/13 


Simvastatin 40 mg PO DAILY 12/22/13 


Apixaban [Eliquis 5 mg Tablet] 5 mg PO BID 05/09/18 


Benzonatate 100 mg PO RTTIDP PRN 05/09/18 


Diltiazem HCl [Diltiazem ER] 120 mg PO DAILY 05/09/18 


Docusate Sodium [Colace 100 mg Capsule] 100 mg PO BID 05/09/18 


Insulin Glargine,Hum.rec.anlog [Toujeo Solostar] 46 unit SQ DAILY 05/09/18 


Linagliptin [Tradjenta] 5 mg PO DAILY 05/09/18 


Meclizine HCl 25 mg PO TID 05/09/18 


Ranitidine HCl 300 mg PO DAILY 05/09/18 


Doxycycline Hyclate 100 mg PO BID #14 capsule 01/16/19 


Famotidine 40 mg PO BID #14 tablet 01/16/19 


Triamcinolone Acetonide [Triderm] 454 gm TP BID #454 gm 01/16/19 








Allergies/Adverse Reactions: 


                                        





No Known Drug Allergies Allergy (Verified 01/18/19 03:22)


   


IVP dye Allergy (Uncoded 05/09/18 15:39)


   











Review of Systems


ROS unobtainable: Due to mental status - Respiratory distress, short of breath





Physical Exam


Vital Signs: 


                                        











Temp Pulse Resp BP Pulse Ox


 


    96   31 H  209/78 H  95 


 


    01/18/19 03:00  01/18/19 05:01  01/18/19 04:03  01/18/19 05:01








                                 Intake & Output











 01/16/19 01/17/19 01/18/19





 11:59 11:59 11:59


 


Weight   90 kg











General appearance: PRESENT: cooperative, obese, severe distress


Head exam: PRESENT: atraumatic, normocephalic


Eye exam: PRESENT: conjunctiva pink, EOMI, PERRLA.  ABSENT: scleral icterus


Ear exam: PRESENT: normal external ear exam


Mouth exam: PRESENT: moist, tongue midline


Neck exam: ABSENT: carotid bruit, JVD, lymphadenopathy, thyromegaly


Respiratory exam: PRESENT: accessory muscle use, clear to auscultation emma, 

crackles, tachypnea.  ABSENT: rales, rhonchi, wheezes


Cardiovascular exam: PRESENT: irregular rhythm, tachycardia.  ABSENT: diastolic 

murmur, rubs, systolic murmur


Pulses: PRESENT: normal dorsalis pedis pul


Vascular exam: PRESENT: normal capillary refill


GI/Abdominal exam: PRESENT: normal bowel sounds, soft.  ABSENT: distended, 

guarding, mass, organolmegaly, rebound, tenderness


Rectal exam: PRESENT: deferred


Extremities exam: PRESENT: full ROM.  ABSENT: calf tenderness, clubbing, pedal 

edema


Neurological exam: PRESENT: alert, awake, oriented to person, oriented to place,

 oriented to time, oriented to situation, CN II-XII grossly intact.  ABSENT: 

motor sensory deficit


Psychiatric exam: PRESENT: appropriate affect, normal mood.  ABSENT: homicidal 

ideation, suicidal ideation


Skin exam: PRESENT: dry, intact, warm.  ABSENT: cyanosis, rash





Results


Laboratory Results: 


                                        





                                 01/18/19 03:05 





                                 01/18/19 03:05 





                                        











  01/18/19 01/18/19





  03:05 03:05


 


WBC  14.5 H 


 


RBC  3.10 L 


 


Hgb  8.9 L 


 


Hct  27.9 L 


 


MCV  90 


 


MCH  28.9 


 


MCHC  32.1 


 


RDW  16.4 H 


 


Plt Count  268 


 


Seg Neutrophils %  79.2 H 


 


Lymphocytes %  8.5 L 


 


Monocytes %  6.2 


 


Eosinophils %  4.8 


 


Basophils %  1.3 


 


Absolute Neutrophils  11.5 H 


 


Absolute Lymphocytes  1.2 


 


Absolute Monocytes  0.9 


 


Absolute Eosinophils  0.7 H 


 


Absolute Basophils  0.2 


 


Sodium   145.0


 


Potassium   5.7 H


 


Chloride   108 H


 


Carbon Dioxide   30


 


Anion Gap   7


 


BUN   51 H


 


Creatinine   1.85 H


 


Est GFR ( Amer)   32 L


 


Est GFR (Non-Af Amer)   26 L


 


Glucose   183 H


 


Calcium   9.0


 


Total Bilirubin   0.6


 


AST   26


 


ALT   10


 


Alkaline Phosphatase   91


 


Total Protein   7.3


 


Albumin   3.8








                                        











  01/18/19





  03:05


 


Troponin I  0.024











Impressions: 


                                        





Chest X-Ray  01/18/19 03:06


IMPRESSION:


 


No acute cardiopulmonary abnormality


 


 


copyright 2011 [employer]- All Rights Reserved


 














Assessment & Plan





- Diagnosis


(1) Shortness of breath


Is this a current diagnosis for this admission?: Yes   


Plan: 


Multifactorial shortness of breath, hypertensive urgency, A. fib with RVR and 

congestive heart failure, COPD exacerbation and anemia.  BiPAP with supplemental

 oxygen, IV Lasix, IV Lopressor, hydralazine as needed.








(2) Hypertensive urgency


Is this a current diagnosis for this admission?: Yes   


Plan: 


Unclear compliance, IV Lasix, Lopressor and hydralazine initiated.








(3) Hyperkalemia


Is this a current diagnosis for this admission?: Yes   


Plan: 


Lasix and lactulose trial, no EKG changes follow-up chemistry








(4) Chronic renal failure


Is this a current diagnosis for this admission?: Yes   


Plan: 


At baseline, avoid nephrotoxic meds and doses, follow-up chemistry








(5) Diabetes


Is this a current diagnosis for this admission?: Yes   


Plan: 


Outpatient regiment with Humalog sliding scale coverage








(6) Anemia


Is this a current diagnosis for this admission?: Yes   


Plan: 


Follow-up anemia workup, consider packed red blood cell transfusion.








(7) Atrial fibrillation with RVR


Is this a current diagnosis for this admission?: Yes   


Plan: 


IV Lopressor, follow-up cardiac enzymes and BNP








(8) Congestive heart failure


Qualifiers: 


   Heart failure type: systolic 


Is this a current diagnosis for this admission?: Yes   


Plan: 


Likely diastolic, optimize rate control, diuresis and BiPAP.  Follow-up 2D echo








- Time


Time Spent: 50 to 70 Minutes





- Inpatient Certification


Medical Necessity: Need Close Monitoring Due to Risk of Patient Decompensation

## 2019-01-18 NOTE — EKG REPORT
SEVERITY:- ABNORMAL ECG -

ATRIAL FIBRILLATION, V-RATE 

LOW VOLTAGE IN FRONTAL LEADS

:

Confirmed by: George Melara 18-Jan-2019 09:23:56
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
## 2019-01-18 NOTE — XCELERA REPORT
[address] [place]

                             [facility] [address]

                               Tel: [phone]

                               Fax: [phone]



                       Lower Extremity Venous Evaluation

_______________________________________________________________________________



_______________________________________________________________________________

Procedure: Color flow and duplex imaging of the veins of the left lower

extremity as well as the right Common Femoral vein.





_______________________________________________________________________________



_______________________________________________________________________________



Right Sided Venous Evaluation

The right common femoral vein is fully compressible. Spontaneous and phasic

flow is present in the right common femoral vein.



Left Sided Venous Evaluation

Normal vessel filling wall to wall, compression and augmentation as well as

Colour flow down to the infrageniculate veins.



_______________________________________________________________________________



Interpretation Summary

No duplex evidence of DVT or obstruction in the left lower extremity nor in

the right Common Femoral vein.

_______________________________________________________________________________



Name: ROBERT EVANGELISTA

MRN: [id number]

Age: 83 yrs

Gender: Female                                     : 1935

Patient Status: Inpatient                          Patient Location:

[facility]A

Account #: [id number]

Study Date: 2019 10:38 AM

                          Accession #: [id number]

Reason For Study: left leg swelling

Ordering Physician: INES AU

Performed By: Evin Rey

Electronically signed by:      Lennox Williams      on 2019 04:28 PM



CC: INES AU

>

Williams, Lennox

## 2019-01-18 NOTE — PDOC PROGRESS REPORT
Subjective


Progress Note for:: 01/18/19


Subjective:: 





No acute events since the admission.  Patient is afebrile.  Patient is 

comfortably in the bed on BiPAP.  Denies any complaints.


Reason For Visit: 


HTN URGENCY, AFIB, HEART FAILURE, COPD








Physical Exam


Vital Signs: 


                                        











Temp Pulse Resp BP Pulse Ox


 


 97.4 F   104 H  24 H  159/69 H  99 


 


 01/18/19 11:35  01/18/19 12:51  01/18/19 12:51  01/18/19 11:35  01/18/19 12:51








                                 Intake & Output











 01/17/19 01/18/19 01/19/19





 06:59 06:59 06:59


 


Intake Total   222


 


Balance   222


 


Weight  90 kg 87 kg











General appearance: PRESENT: mild distress


Head exam: PRESENT: atraumatic


Eye exam: PRESENT: PERRLA


Mouth exam: PRESENT: moist


Teeth exam: PRESENT: poor dentation


Neck exam: ABSENT: carotid bruit, JVD, lymphadenopathy, thyromegaly


Respiratory exam: PRESENT: decreased breath sounds, wheezes


Cardiovascular exam: PRESENT: irregular rhythm, systolic murmur, tachycardia


GI/Abdominal exam: PRESENT: normal bowel sounds, soft.  ABSENT: distended, 

guarding, mass, organolmegaly, rebound, tenderness


Extremities exam: PRESENT: full ROM.  ABSENT: calf tenderness, clubbing, pedal 

edema


Neurological exam: PRESENT: alert, awake, oriented to person, oriented to place,

oriented to time, oriented to situation, CN II-XII grossly intact.  ABSENT: 

motor sensory deficit


Psychiatric exam: PRESENT: appropriate affect, normal mood.  ABSENT: homicidal 

ideation, suicidal ideation





Results


Laboratory Results: 


                                        





                                 01/18/19 03:05 





                                        











  01/18/19 01/18/19 01/18/19





  03:05 03:05 03:05


 


WBC  14.5 H  


 


RBC  3.10 L  


 


Hgb  8.9 L  


 


Hct  27.9 L  


 


MCV  90  


 


MCH  28.9  


 


MCHC  32.1  


 


RDW  16.4 H  


 


Plt Count  268  


 


Seg Neutrophils %  79.2 H  


 


Lymphocytes %  8.5 L  


 


Monocytes %  6.2  


 


Eosinophils %  4.8  


 


Basophils %  1.3  


 


Absolute Neutrophils  11.5 H  


 


Absolute Lymphocytes  1.2  


 


Absolute Monocytes  0.9  


 


Absolute Eosinophils  0.7 H  


 


Absolute Basophils  0.2  


 


Retic Count (auto)    3.41 H


 


Absolute Retic    0.108


 


Sodium   145.0 


 


Potassium   5.7 H 


 


Chloride   108 H 


 


Carbon Dioxide   30 


 


Anion Gap   7 


 


BUN   51 H 


 


Creatinine   1.85 H 


 


Est GFR ( Amer)   32 L 


 


Est GFR (Non-Af Amer)   26 L 


 


Glucose   183 H 


 


Calcium   9.0 


 


Iron   


 


TIBC   


 


% Saturation   


 


Ferritin   


 


Total Bilirubin   0.6 


 


AST   26 


 


ALT   10 


 


Alkaline Phosphatase   91 


 


Total Protein   7.3 


 


Albumin   3.8 


 


Vitamin B12   


 


Folate   














  01/18/19





  06:35


 


WBC 


 


RBC 


 


Hgb 


 


Hct 


 


MCV 


 


MCH 


 


MCHC 


 


RDW 


 


Plt Count 


 


Seg Neutrophils % 


 


Lymphocytes % 


 


Monocytes % 


 


Eosinophils % 


 


Basophils % 


 


Absolute Neutrophils 


 


Absolute Lymphocytes 


 


Absolute Monocytes 


 


Absolute Eosinophils 


 


Absolute Basophils 


 


Retic Count (auto) 


 


Absolute Retic 


 


Sodium 


 


Potassium 


 


Chloride 


 


Carbon Dioxide 


 


Anion Gap 


 


BUN 


 


Creatinine 


 


Est GFR ( Amer) 


 


Est GFR (Non-Af Amer) 


 


Glucose 


 


Calcium 


 


Iron  34.5 L


 


TIBC  262


 


% Saturation  13


 


Ferritin  193.00


 


Total Bilirubin 


 


AST 


 


ALT 


 


Alkaline Phosphatase 


 


Total Protein 


 


Albumin 


 


Vitamin B12  673.0


 


Folate  8.48








                                        











  01/18/19 01/18/19 01/18/19





  03:05 06:35 06:35


 


Creatine Kinase   52 


 


CK-MB (CK-2)    1.63


 


Troponin I  0.024   0.036


 


NT-Pro-B Natriuret Pep    6890 H














  01/18/19 01/18/19





  12:27 12:27


 


Creatine Kinase  56 


 


CK-MB (CK-2)   1.84


 


Troponin I   0.019


 


NT-Pro-B Natriuret Pep  











Impressions: 


                                        





Chest X-Ray  01/18/19 03:06


IMPRESSION:


 


No acute cardiopulmonary abnormality


 


 


copyright 2011 [employer]- All Rights Reserved


 














Assessment & Plan





- Diagnosis


(1) Shortness of breath


Is this a current diagnosis for this admission?: Yes   


Plan: 


Multifactorial shortness of breath, hypertensive urgency, A. fib with RVR and 

congestive heart failure, COPD exacerbation and anemia.  BiPAP with supplemental

oxygen, IV Lasix, IV Lopressor, hydralazine as needed.








1/18/2019-patient has been admitted with acute on chronic respiratory failure 

with hypoxia she is on BiPAP as needed right now she is getting her scheduled 

nebulizations.  And started on IV antibiotic therapy with Zosyn.  Plan is to 

continue the nebulizations and started on IV Solu-Medrol.  As mentioned above 

patient is on albuterol nebs and also on Xopenex nebulizations.  Chest x-ray is 

negative for any pneumonia or fluid overload.








(2) Atrial fibrillation with RVR


Is this a current diagnosis for this admission?: Yes   


Plan: 


1/18/2019 patient has history of atrial fibrillation with rapid ventricular 

rate.  Patient is on Eliquis 2.5 mg p.o. twice daily at home plan is to resume 

the medication while  she was in the hospital.  Patient is on digoxin 0.125 mg 

daily, diltiazem 120 mg p.o. daily and metoprolol 100 mg p.o. daily those 

medications are resumed.








(3) Hyperkalemia


Is this a current diagnosis for this admission?: Yes   


Plan: 


1/18/2019 potassium level is 5.7 in the emergency room.  She was given lactulose

1 dose.  Plan to recheck potassium levels today.








(4) Diabetes


Is this a current diagnosis for this admission?: Yes   


Plan: 


1/18/2019-and has history of diabetes mellitus P going to check her hemoglobin 

A1c she was placed on a insulin sliding scale before meals and at bedtime.  

Patient was on glargine and insulin 46 units at bedtime.  Plan is to restart her

home medications.








(5) Anemia


Is this a current diagnosis for this admission?: Yes   


Plan: 


1/18/2019-patient hemoglobin is 9.0.  Plan is to recheck her CBC tomorrow.  

Anemia probably secondary to chronic kidney disease.








- Time


Time Spent with patient: 15-24 minutes


Medications reviewed and adjusted accordingly: Yes


Anticipated discharge: Home

## 2019-01-18 NOTE — RADIOLOGY REPORT (SQ)
EXAM DESCRIPTION: 



XR CHEST 1 VIEW



COMPLETED DATE/TME:  01/18/2019 03:06



CLINICAL HISTORY: 



83 years, Female, Shortness of breath



COMPARISON:

5/9/2018



NUMBER OF VIEWS:

One



TECHNIQUE:

AP view of the chest



LIMITATIONS:

None.



FINDINGS:



Lungs are clear. The heart is enlarged. There is no pneumothorax

or pleural effusion. There is no acute fracture



IMPRESSION:



No acute cardiopulmonary abnormality

 



copyright 2011 [employer]- All Rights Reserved

## 2019-01-19 LAB
ABSOLUTE LYMPHOCYTES# (MANUAL): 0.7 10^3/UL (ref 0.5–4.7)
ABSOLUTE MONOCYTES # (MANUAL): 0.4 10^3/UL (ref 0.1–1.4)
ABSOLUTE NEUTROPHILS# (MANUAL): 12.9 10^3/UL (ref 1.7–8.2)
ADD MANUAL DIFF: YES
ANION GAP SERPL CALC-SCNC: 5 MMOL/L (ref 5–19)
ANISOCYTOSIS BLD QL SMEAR: (no result)
BASOPHILS NFR BLD MANUAL: 0 % (ref 0–2)
BUN SERPL-MCNC: 58 MG/DL (ref 7–20)
CALCIUM: 8.8 MG/DL (ref 8.4–10.2)
CHLORIDE SERPL-SCNC: 104 MMOL/L (ref 98–107)
CO2 SERPL-SCNC: 30 MMOL/L (ref 22–30)
EOSINOPHIL NFR BLD MANUAL: 0 % (ref 0–6)
ERYTHROCYTE [DISTWIDTH] IN BLOOD BY AUTOMATED COUNT: 16.1 % (ref 11.5–14)
GLUCOSE SERPL-MCNC: 107 MG/DL (ref 75–110)
HCT VFR BLD CALC: 22 % (ref 36–47)
HGB BLD-MCNC: 7.2 G/DL (ref 12–15.5)
MCH RBC QN AUTO: 28.7 PG (ref 27–33.4)
MCHC RBC AUTO-ENTMCNC: 32.8 G/DL (ref 32–36)
MCV RBC AUTO: 87 FL (ref 80–97)
MONOCYTES % (MANUAL): 3 % (ref 3–13)
PLATELET # BLD: 208 10^3/UL (ref 150–450)
PLATELET COMMENT: ADEQUATE
POLYCHROMASIA BLD QL SMEAR: (no result)
POTASSIUM SERPL-SCNC: 5.8 MMOL/L (ref 3.6–5)
RBC # BLD AUTO: 2.51 10^6/UL (ref 3.72–5.28)
SEGMENTED NEUTROPHILS % (MAN): 92 % (ref 42–78)
SODIUM SERPL-SCNC: 139.1 MMOL/L (ref 137–145)
TOTAL CELLS COUNTED BLD: 100
VARIANT LYMPHS NFR BLD MANUAL: 5 % (ref 13–45)
WBC # BLD AUTO: 14 10^3/UL (ref 4–10.5)

## 2019-01-19 RX ADMIN — METHYLPREDNISOLONE SODIUM SUCCINATE SCH MG: 40 INJECTION, POWDER, FOR SOLUTION INTRAMUSCULAR; INTRAVENOUS at 21:35

## 2019-01-19 RX ADMIN — INSULIN HUMAN PRN UNIT: 100 INJECTION, SOLUTION PARENTERAL at 21:44

## 2019-01-19 RX ADMIN — FUROSEMIDE SCH MG: 10 INJECTION, SOLUTION INTRAMUSCULAR; INTRAVENOUS at 10:32

## 2019-01-19 RX ADMIN — ASPIRIN SCH MG: 81 TABLET, COATED ORAL at 10:33

## 2019-01-19 RX ADMIN — APIXABAN SCH MG: 2.5 TABLET, FILM COATED ORAL at 21:30

## 2019-01-19 RX ADMIN — Medication SCH ML: at 21:35

## 2019-01-19 RX ADMIN — INSULIN HUMAN PRN UNIT: 100 INJECTION, SOLUTION PARENTERAL at 17:36

## 2019-01-19 RX ADMIN — APIXABAN SCH MG: 2.5 TABLET, FILM COATED ORAL at 10:32

## 2019-01-19 RX ADMIN — Medication SCH ML: at 06:24

## 2019-01-19 RX ADMIN — DOCUSATE SODIUM SCH MG: 100 CAPSULE, LIQUID FILLED ORAL at 10:32

## 2019-01-19 RX ADMIN — FLUTICASONE PROPIONATE SCH SPRAYS: 50 SPRAY, METERED NASAL at 21:30

## 2019-01-19 RX ADMIN — INSULIN GLARGINE SCH UNIT: 100 INJECTION, SOLUTION SUBCUTANEOUS at 10:33

## 2019-01-19 RX ADMIN — NITROGLYCERIN SCH EACH: 0.2 PATCH TRANSDERMAL at 10:31

## 2019-01-19 RX ADMIN — LEVOTHYROXINE SODIUM SCH MG: 112 TABLET ORAL at 06:24

## 2019-01-19 RX ADMIN — LEVALBUTEROL HYDROCHLORIDE PRN MG: 1.25 SOLUTION RESPIRATORY (INHALATION) at 14:43

## 2019-01-19 RX ADMIN — SIMVASTATIN SCH MG: 40 TABLET, FILM COATED ORAL at 21:30

## 2019-01-19 RX ADMIN — PIPERACILLIN AND TAZOBACTAM SCH: 3; .375 INJECTION, POWDER, LYOPHILIZED, FOR SOLUTION INTRAVENOUS; PARENTERAL at 01:53

## 2019-01-19 RX ADMIN — FLUTICASONE PROPIONATE SCH SPRAYS: 50 SPRAY, METERED NASAL at 10:33

## 2019-01-19 RX ADMIN — Medication SCH: at 13:13

## 2019-01-19 RX ADMIN — FUROSEMIDE SCH MG: 10 INJECTION, SOLUTION INTRAMUSCULAR; INTRAVENOUS at 21:31

## 2019-01-19 RX ADMIN — DIGOXIN SCH MG: 0.12 TABLET ORAL at 10:32

## 2019-01-19 RX ADMIN — METOPROLOL SUCCINATE SCH MG: 50 TABLET, EXTENDED RELEASE ORAL at 10:32

## 2019-01-19 RX ADMIN — SITAGLIPTIN SCH MG: 25 TABLET, FILM COATED ORAL at 10:32

## 2019-01-19 RX ADMIN — CALCITRIOL SCH MCG: 0.25 CAPSULE, LIQUID FILLED ORAL at 10:31

## 2019-01-19 RX ADMIN — METHYLPREDNISOLONE SODIUM SUCCINATE SCH MG: 40 INJECTION, POWDER, FOR SOLUTION INTRAMUSCULAR; INTRAVENOUS at 10:32

## 2019-01-19 RX ADMIN — DILTIAZEM HYDROCHLORIDE SCH MG: 120 CAPSULE, COATED, EXTENDED RELEASE ORAL at 10:32

## 2019-01-19 NOTE — PROGRESS NOTE E
Progress Note



NAME: ROBERT EVANGELISTA

MRN: [id number]

: 1935             AGE: 83Y

DATE: 2019                    ROOM: 306



SUBJECTIVE:

The patient is a pleasant 83-year-old female who has a past medical

history of atrial fibrillation, hypertension, COPD.  The patient admitted

with COPD exacerbation and shortness of breath.  She is feeling better

today.



OBJECTIVE:

GENERAL:  Patient is lying in bed, comfortable, not in distress.



VITAL SIGNS:  Blood pressure is 115/98, temperature 97.4, heart rate 75.



HEENT:  Normocephalic, atraumatic.  Pupils equal, round, reactive to light

and accommodation bilaterally.  Extraocular movements intact.  Ears: 

Tympanic membranes intact bilaterally.  No discharge from the ears.  No

discharge from the nose.



NECK:  Supple.  No increased JVD.  No thyromegaly, no lymphadenopathy.



CARDIOVASCULAR:  Normal S1, S2.  Regular rate and rhythm.  No murmur, no

gallop.



RESPIRATORY:  Lungs:  Bilateral crackles and wheezing.



ABDOMEN:  Soft, nontender.



MUSCULOSKELETAL:  No edema.



NEUROLOGIC:  Awake, alert.



SKIN:  No rash.



LABORATORY DATA:

Sodium 114, potassium 7.2.  Hematocrit is 22.  Sodium 139, potassium 5.8. 

Creatinine is 1.8.



ASSESSMENT:

1.   COPD EXACERBATION.

2.   SHORTNESS OF BREATH.

3.   ATRIAL FIBRILLATION WITH RVR.

4.   ANEMIA OF CHRONIC DISEASE.

5.   HYPERKALEMIA, PROBABLY SECONDARY TO MEDICATION.  Patient is on Cozaar 100

     mg daily.

6.   DIABETES MELLITUS TYPE 2.



PLAN:

Will continue methylprednisolone nebulizer.  Continue Lasix 40 mg IV q.12

hours.  Discontinue Cozaar because of hyperkalemia and will give her

Kayexalate 1 dose.  Repeat potassium later today.



DISPOSITION:

Probably home in 1 or 2 days.



MEDICAL NECESSITY:

The patient is still on IV Solu-Medrol and she has had hyperkalemia. 

Needs to be monitored.





DICTATING PHYSICIAN:  LYNSEY MONTES M.D.





[id number]                  DT: 2019    1517

PHY#: [id number]            DD: 2019    0916

ID:   [id number]           JOB#: [id number]       ACCT: [id number]



cc:

>

## 2019-01-20 LAB
ABSOLUTE LYMPHOCYTES# (MANUAL): 0.9 10^3/UL (ref 0.5–4.7)
ABSOLUTE LYMPHOCYTES# (MANUAL): 1.2 10^3/UL (ref 0.5–4.7)
ABSOLUTE MONOCYTES # (MANUAL): 0 10^3/UL (ref 0.1–1.4)
ABSOLUTE MONOCYTES # (MANUAL): 0.2 10^3/UL (ref 0.1–1.4)
ABSOLUTE NEUTROPHILS# (MANUAL): 13.8 10^3/UL (ref 1.7–8.2)
ABSOLUTE NEUTROPHILS# (MANUAL): 14.7 10^3/UL (ref 1.7–8.2)
ADD MANUAL DIFF: YES
ADD MANUAL DIFF: YES
ALBUMIN SERPL-MCNC: 3.1 G/DL (ref 3.5–5)
ANION GAP SERPL CALC-SCNC: 9 MMOL/L (ref 5–19)
ANISOCYTOSIS BLD QL SMEAR: (no result)
ANISOCYTOSIS BLD QL SMEAR: (no result)
BASOPHILS NFR BLD MANUAL: 0 % (ref 0–2)
BASOPHILS NFR BLD MANUAL: 0 % (ref 0–2)
BUN SERPL-MCNC: 72 MG/DL (ref 7–20)
CALCIUM: 8.8 MG/DL (ref 8.4–10.2)
CHLORIDE SERPL-SCNC: 99 MMOL/L (ref 98–107)
CO2 SERPL-SCNC: 28 MMOL/L (ref 22–30)
EOSINOPHIL NFR BLD MANUAL: 0 % (ref 0–6)
EOSINOPHIL NFR BLD MANUAL: 0 % (ref 0–6)
ERYTHROCYTE [DISTWIDTH] IN BLOOD BY AUTOMATED COUNT: 16.2 % (ref 11.5–14)
ERYTHROCYTE [DISTWIDTH] IN BLOOD BY AUTOMATED COUNT: 16.4 % (ref 11.5–14)
GLUCOSE SERPL-MCNC: 178 MG/DL (ref 75–110)
HCT VFR BLD CALC: 23.2 % (ref 36–47)
HCT VFR BLD CALC: 23.9 % (ref 36–47)
HGB BLD-MCNC: 7.6 G/DL (ref 12–15.5)
HGB BLD-MCNC: 7.8 G/DL (ref 12–15.5)
MCH RBC QN AUTO: 28.6 PG (ref 27–33.4)
MCH RBC QN AUTO: 28.7 PG (ref 27–33.4)
MCHC RBC AUTO-ENTMCNC: 32.5 G/DL (ref 32–36)
MCHC RBC AUTO-ENTMCNC: 32.6 G/DL (ref 32–36)
MCV RBC AUTO: 88 FL (ref 80–97)
MCV RBC AUTO: 88 FL (ref 80–97)
MONOCYTES % (MANUAL): 0 % (ref 3–13)
MONOCYTES % (MANUAL): 1 % (ref 3–13)
OVALOCYTES BLD QL SMEAR: SLIGHT
OVALOCYTES BLD QL SMEAR: SLIGHT
PHOSPHATE SERPL-MCNC: 5.5 MG/DL (ref 2.5–4.5)
PLATELET # BLD: 229 10^3/UL (ref 150–450)
PLATELET # BLD: 230 10^3/UL (ref 150–450)
PLATELET COMMENT: ADEQUATE
PLATELET COMMENT: ADEQUATE
POIKILOCYTOSIS BLD QL SMEAR: (no result)
POIKILOCYTOSIS BLD QL SMEAR: SLIGHT
POTASSIUM SERPL-SCNC: 5.5 MMOL/L (ref 3.6–5)
RBC # BLD AUTO: 2.65 10^6/UL (ref 3.72–5.28)
RBC # BLD AUTO: 2.7 10^6/UL (ref 3.72–5.28)
SCHISTOCYTES BLD QL SMEAR: SLIGHT
SEGMENTED NEUTROPHILS % (MAN): 91 % (ref 42–78)
SEGMENTED NEUTROPHILS % (MAN): 94 % (ref 42–78)
SODIUM SERPL-SCNC: 135.7 MMOL/L (ref 137–145)
TOTAL CELLS COUNTED BLD: 100
TOTAL CELLS COUNTED BLD: 100
TOXIC GRANULES BLD QL SMEAR: (no result)
TOXIC GRANULES BLD QL SMEAR: (no result)
VARIANT LYMPHS NFR BLD MANUAL: 6 % (ref 13–45)
VARIANT LYMPHS NFR BLD MANUAL: 8 % (ref 13–45)
WBC # BLD AUTO: 15.2 10^3/UL (ref 4–10.5)
WBC # BLD AUTO: 15.6 10^3/UL (ref 4–10.5)
WBC TOXIC VACUOLES BLD QL SMEAR: PRESENT

## 2019-01-20 RX ADMIN — FLUTICASONE PROPIONATE SCH SPRAYS: 50 SPRAY, METERED NASAL at 22:12

## 2019-01-20 RX ADMIN — FLUTICASONE PROPIONATE SCH SPRAYS: 50 SPRAY, METERED NASAL at 10:17

## 2019-01-20 RX ADMIN — Medication SCH: at 14:51

## 2019-01-20 RX ADMIN — CALCITRIOL SCH MCG: 0.25 CAPSULE, LIQUID FILLED ORAL at 10:16

## 2019-01-20 RX ADMIN — DILTIAZEM HYDROCHLORIDE SCH MG: 120 CAPSULE, COATED, EXTENDED RELEASE ORAL at 10:16

## 2019-01-20 RX ADMIN — INSULIN HUMAN PRN UNIT: 100 INJECTION, SOLUTION PARENTERAL at 17:25

## 2019-01-20 RX ADMIN — LEVOTHYROXINE SODIUM SCH MG: 112 TABLET ORAL at 06:09

## 2019-01-20 RX ADMIN — INSULIN HUMAN PRN UNIT: 100 INJECTION, SOLUTION PARENTERAL at 12:15

## 2019-01-20 RX ADMIN — DOCUSATE SODIUM SCH MG: 100 CAPSULE, LIQUID FILLED ORAL at 10:16

## 2019-01-20 RX ADMIN — ASPIRIN SCH MG: 81 TABLET, COATED ORAL at 10:16

## 2019-01-20 RX ADMIN — FUROSEMIDE SCH MG: 40 TABLET ORAL at 17:25

## 2019-01-20 RX ADMIN — FUROSEMIDE SCH MG: 40 TABLET ORAL at 10:16

## 2019-01-20 RX ADMIN — METOPROLOL SUCCINATE SCH MG: 50 TABLET, EXTENDED RELEASE ORAL at 10:16

## 2019-01-20 RX ADMIN — LEVALBUTEROL HYDROCHLORIDE PRN MG: 1.25 SOLUTION RESPIRATORY (INHALATION) at 11:00

## 2019-01-20 RX ADMIN — APIXABAN SCH MG: 2.5 TABLET, FILM COATED ORAL at 22:09

## 2019-01-20 RX ADMIN — LEVALBUTEROL HYDROCHLORIDE PRN MG: 1.25 SOLUTION RESPIRATORY (INHALATION) at 16:40

## 2019-01-20 RX ADMIN — NITROGLYCERIN SCH EACH: 0.2 PATCH TRANSDERMAL at 10:17

## 2019-01-20 RX ADMIN — DIGOXIN SCH MG: 0.12 TABLET ORAL at 10:18

## 2019-01-20 RX ADMIN — Medication SCH ML: at 06:10

## 2019-01-20 RX ADMIN — INSULIN HUMAN PRN UNIT: 100 INJECTION, SOLUTION PARENTERAL at 22:09

## 2019-01-20 RX ADMIN — SITAGLIPTIN SCH MG: 25 TABLET, FILM COATED ORAL at 10:16

## 2019-01-20 RX ADMIN — PREDNISONE SCH MG: 20 TABLET ORAL at 10:16

## 2019-01-20 RX ADMIN — APIXABAN SCH MG: 2.5 TABLET, FILM COATED ORAL at 10:16

## 2019-01-20 RX ADMIN — INSULIN GLARGINE SCH UNIT: 100 INJECTION, SOLUTION SUBCUTANEOUS at 10:17

## 2019-01-20 RX ADMIN — PREDNISONE SCH MG: 20 TABLET ORAL at 17:25

## 2019-01-20 RX ADMIN — Medication SCH ML: at 22:10

## 2019-01-20 RX ADMIN — SIMVASTATIN SCH MG: 40 TABLET, FILM COATED ORAL at 22:09

## 2019-01-21 LAB
ERYTHROCYTE [DISTWIDTH] IN BLOOD BY AUTOMATED COUNT: 16 % (ref 11.5–14)
HCT VFR BLD CALC: 25 % (ref 36–47)
HGB BLD-MCNC: 8.3 G/DL (ref 12–15.5)
MCH RBC QN AUTO: 28.9 PG (ref 27–33.4)
MCHC RBC AUTO-ENTMCNC: 33.1 G/DL (ref 32–36)
MCV RBC AUTO: 87 FL (ref 80–97)
PLATELET # BLD: 248 10^3/UL (ref 150–450)
RBC # BLD AUTO: 2.86 10^6/UL (ref 3.72–5.28)
WBC # BLD AUTO: 14.1 10^3/UL (ref 4–10.5)

## 2019-01-21 PROCEDURE — 30233N1 TRANSFUSION OF NONAUTOLOGOUS RED BLOOD CELLS INTO PERIPHERAL VEIN, PERCUTANEOUS APPROACH: ICD-10-PCS | Performed by: NURSE PRACTITIONER

## 2019-01-21 RX ADMIN — INSULIN GLARGINE SCH UNIT: 100 INJECTION, SOLUTION SUBCUTANEOUS at 09:29

## 2019-01-21 RX ADMIN — LEVOTHYROXINE SODIUM SCH MG: 112 TABLET ORAL at 06:20

## 2019-01-21 RX ADMIN — PREDNISONE SCH MG: 20 TABLET ORAL at 19:52

## 2019-01-21 RX ADMIN — SIMVASTATIN SCH MG: 40 TABLET, FILM COATED ORAL at 21:27

## 2019-01-21 RX ADMIN — NITROGLYCERIN SCH EACH: 0.2 PATCH TRANSDERMAL at 09:29

## 2019-01-21 RX ADMIN — SITAGLIPTIN SCH MG: 25 TABLET, FILM COATED ORAL at 09:28

## 2019-01-21 RX ADMIN — DOCUSATE SODIUM SCH MG: 100 CAPSULE, LIQUID FILLED ORAL at 09:28

## 2019-01-21 RX ADMIN — APIXABAN SCH MG: 2.5 TABLET, FILM COATED ORAL at 09:28

## 2019-01-21 RX ADMIN — INSULIN HUMAN PRN UNIT: 100 INJECTION, SOLUTION PARENTERAL at 18:30

## 2019-01-21 RX ADMIN — INSULIN HUMAN PRN UNIT: 100 INJECTION, SOLUTION PARENTERAL at 11:36

## 2019-01-21 RX ADMIN — DILTIAZEM HYDROCHLORIDE SCH MG: 120 CAPSULE, COATED, EXTENDED RELEASE ORAL at 09:28

## 2019-01-21 RX ADMIN — PREDNISONE SCH MG: 20 TABLET ORAL at 09:28

## 2019-01-21 RX ADMIN — FUROSEMIDE SCH MG: 40 TABLET ORAL at 09:28

## 2019-01-21 RX ADMIN — FLUTICASONE PROPIONATE SCH SPRAYS: 50 SPRAY, METERED NASAL at 22:10

## 2019-01-21 RX ADMIN — Medication SCH: at 14:27

## 2019-01-21 RX ADMIN — METOPROLOL SUCCINATE SCH MG: 50 TABLET, EXTENDED RELEASE ORAL at 09:28

## 2019-01-21 RX ADMIN — FLUTICASONE PROPIONATE SCH SPRAYS: 50 SPRAY, METERED NASAL at 09:28

## 2019-01-21 RX ADMIN — APIXABAN SCH MG: 2.5 TABLET, FILM COATED ORAL at 21:27

## 2019-01-21 RX ADMIN — INSULIN HUMAN PRN UNIT: 100 INJECTION, SOLUTION PARENTERAL at 21:31

## 2019-01-21 RX ADMIN — DIGOXIN SCH MG: 0.12 TABLET ORAL at 09:28

## 2019-01-21 RX ADMIN — Medication SCH ML: at 06:20

## 2019-01-21 RX ADMIN — CALCITRIOL SCH MCG: 0.25 CAPSULE, LIQUID FILLED ORAL at 09:28

## 2019-01-21 RX ADMIN — FERROUS SULFATE TAB 325 MG (65 MG ELEMENTAL FE) SCH MG: 325 (65 FE) TAB at 19:52

## 2019-01-21 RX ADMIN — Medication SCH ML: at 21:27

## 2019-01-21 RX ADMIN — ASPIRIN SCH MG: 81 TABLET, COATED ORAL at 09:28

## 2019-01-21 RX ADMIN — FUROSEMIDE SCH MG: 40 TABLET ORAL at 19:52

## 2019-01-21 NOTE — PDOC PROGRESS REPORT
Addendum entered and electronically signed by YASMEEN PETTY NP-C  01/21/19 

19:23: 








Provider Note


Provider Note: 


Received call from Dr. Rousseau after echocardiogram review.  Patient is found

to have severe pulmonary hypertension by echo with an RSVP of 145-150 mm of Hg.


Dr. Scott recommends arranging for transfer of the patient to [facility]'s pulmonary hypertension service in the morning.  Dr. Scott advises 

against discharge to home as had been previously planned.





Original Note:








Subjective


Progress Note for:: 01/21/19


Subjective:: 


The patient is an 83-year-old female with a past medical history of atrial 

fibrillation, hypertension, COPD, CHF, and DM2 who was admitted 1/18/19 for 

acute hypoxic respiratory failure secondary to COPD and CHF exacerbations. 





Patient was seen on morning rounds with family present (introduces me to her 

daughter and son).  She reports that she is feeling well today.  She does report

continued fatigue, orthopnea, and dyspnea on exertion.  The patient is noted to 

be sitting semi-sumner's with 3 pillows.  She reports that she has 3 pillow 

orthopnea at home which has been her normal for several years.  She does tell me

that if I were to lay her flat she would very quickly become short of breath.  

She does note that she feels improved as compared to when she was admitted and 

is pleased by the resolution of the edema to her bilateral extremities.  The 

patient's daughter tells me that she was ambulatory to the shower and returned 

to her room on room air with slight dizziness and shortness of breath.


We discussed the patient's chronic anemia; followed by Dr. Aranda and regularly

receives iron infusions.  She has never received a transfusion before and does 

not know her baseline hemoglobin but expresses some interest in receiving a 

transfusion today.  Were answered to the best of my ability.  


The patient's daughter has numerous questions that were answered to the best of 

my ability.  The patient's daughter says that they would like to discuss a blood

transfusion amongst themselves before providing a decision.


The patient has no other specific questions or concerns today.


No concerns per nursing.


Reason For Visit: 


HTN URGENCY, AFIB, HEART FAILURE, COPD








Physical Exam


Vital Signs: 


                                        











Temp Pulse Resp BP Pulse Ox


 


 97.6 F   86   16   148/82 H  98 


 


 01/21/19 07:26  01/21/19 08:00  01/21/19 08:00  01/21/19 07:26  01/21/19 08:00








                                 Intake & Output











 01/20/19 01/21/19 01/22/19





 06:59 06:59 06:59


 


Intake Total  240 


 


Balance  240 


 


Weight 91.9 kg 89.7 kg 











General appearance: PRESENT: no acute distress, cooperative, obese, well-

developed, well-nourished


Head exam: PRESENT: atraumatic, normocephalic


Eye exam: PRESENT: conjunctiva pale, EOMI, PERRLA.  ABSENT: scleral icterus


Ear exam: PRESENT: normal external ear exam


Mouth exam: PRESENT: moist, tongue midline


Neck exam: ABSENT: carotid bruit, JVD, lymphadenopathy, thyromegaly


Respiratory exam: PRESENT: clear to auscultation emma, decreased breath sounds - 

bibasilar, symmetrical, unlabored, other - supplemental oxygen via NC.  ABSENT: 

rales, rhonchi, wheezes


Cardiovascular exam: PRESENT: RRR, +S1, +S2.  ABSENT: diastolic murmur, rubs, 

systolic murmur


Pulses: PRESENT: normal dorsalis pedis pul


Vascular exam: PRESENT: normal capillary refill


GI/Abdominal exam: PRESENT: normal bowel sounds, soft.  ABSENT: distended, 

guarding, mass, organolmegaly, rebound, tenderness


Rectal exam: PRESENT: deferred


Extremities exam: PRESENT: full ROM, pedal edema - Trace BLE.  ABSENT: calf 

tenderness, clubbing


Neurological exam: PRESENT: alert, awake, oriented to person, oriented to place,

oriented to time, oriented to situation, CN II-XII grossly intact.  ABSENT: 

motor sensory deficit


Psychiatric exam: PRESENT: appropriate affect, normal mood.  ABSENT: homicidal 

ideation, suicidal ideation


Skin exam: PRESENT: dry, intact, warm.  ABSENT: cyanosis, rash





Results


Laboratory Results: 


                                        





                                 01/20/19 09:36 





                                 01/20/19 07:22 





                                        











  01/18/19 01/18/19 01/18/19





  03:05 06:35 06:35


 


Creatine Kinase   52 


 


CK-MB (CK-2)    1.63


 


Troponin I  0.024   0.036


 


NT-Pro-B Natriuret Pep    6890 H














  01/18/19 01/18/19 01/18/19





  12:27 12:27 18:30


 


Creatine Kinase  56   64


 


CK-MB (CK-2)   1.84 


 


Troponin I   0.019 


 


NT-Pro-B Natriuret Pep   














  01/18/19 01/19/19





  18:30 04:01


 


Creatine Kinase  


 


CK-MB (CK-2)  2.11 


 


Troponin I  0.027 


 


NT-Pro-B Natriuret Pep   9440 H











Impressions: 


                                        





Chest X-Ray  01/18/19 03:06


IMPRESSION:


 


No acute cardiopulmonary abnormality


 


 


copyright 2011 [employer]- All Rights Reserved


 














Assessment & Plan





- Diagnosis


(1) Acute respiratory failure with hypoxia


Is this a current diagnosis for this admission?: Yes   


Plan: 


Improved.


Acute respiratory failure with hypoxia secondary to COPD exacerbation and CHF 

exacerbation.  The patient does use home oxygen when sleeping but has not 

required it when ambulatory.  The patient does admit that she is at home walker,

but utilizes electrical carts when at the grocery store and does become short of

breath when walking short distances; cannot walk to her mailbox without taking 

pauses.





Continue supplemental oxygen as needed to maintain oxygen saturations >89%.


Will provide as needed nebulizer treatments.


Continue weaning steroids; transition to p.o. prednisone this morning.


Incentive spirometer and flutter valve to bedside.


Encourage mobility; out of bed daily and amatory in the hallways.








(2) Anemia of chronic disease


Is this a current diagnosis for this admission?: Yes   


Plan: 


Patient reports chronic anemia followed by Dr. Aranda and RAMO Renner (

nephrology).


She reports that she regularly receives iron infusions.


Does not know her baseline hemoglobin; admitted with a hemoglobin of 8.9 which 

is trended downward to 7.6 despite diuresis.





Have started the patient on twice daily ferrous sulfate supplementation.


Discussing with family members option for 1 unit PRBC; will make nursing aware 

if they decide to pursue this option.








(3) Hyperkalemia


Is this a current diagnosis for this admission?: Yes   


Plan: 


Likely secondary to CKD; potassium consistently elevated to 5.5.


Unfortunately, Kayexalate is on nationwide backorder.





Will initiate Veltassa; of note, this medication has a very slow onset of action

and it is unlikely to make a significant difference in her laboratory values for

several days.








(4) COPD exacerbation


Is this a current diagnosis for this admission?: Yes   


Plan: 


Improved.





Continue supplemental oxygen, as needed nebs, daily prednisone therapy, Mucinex 

twice daily, incentive spirometer and flutter valve.


Start Spiriva.








(5) Atrial fibrillation with RVR


Is this a current diagnosis for this admission?: Yes   


Plan: 


Now rate controlled in the 80s.





Continue digoxin, diltiazem, metoprolol, and renally dosed Eliquis.








(6) CHF (congestive heart failure)


Is this a current diagnosis for this admission?: Yes   


Plan: 


ProBNP elevated to 9k


Echocardiogram pending





Has received IV furosemide for diuresis; has been transitioned to p.o. lasix.


Continue antihypertensives as above.


Daily aspirin and statin therapy.


Cardiac diet.


Daily weights and strict I&O's








(7) Diabetes


Qualifiers: 


   Diabetes mellitus type: type 2 


Is this a current diagnosis for this admission?: Yes   


Plan: 


Blood glucose expectedly elevated secondary to steroid therapy.  





Continue home dose Januvia.  Lantus nightly.


Consistent carb diet.


Accu-Cheks before meals and at bedtime with Humalog for sliding scale coverage.








- Time


Time Spent with patient: 25-34 minutes


Medications reviewed and adjusted accordingly: Yes


Anticipated discharge: Home


Within: within 48 hours

## 2019-01-21 NOTE — XCELERA REPORT
[address] [address]

                               Tel: [phone]

                               Fax: [phone]



                      Transthoracic Echocardiogram Report

_______________________________________________________________________________



Name: ROBERT EVANGELISTA

MRN: [id number]                           Age: 83 yrs

Gender: Female                            : 1935

Patient Status: Inpatient                 Patient Location: [address] [place], [place] [address]

Account #: [id number]

Study Date: 2019 09:39 AM

Accession #: [id number]

_______________________________________________________________________________



Height: 64 in        Weight: 192 lb        BSA: 1.9 m2

_______________________________________________________________________________

Procedure: A two-dimensional transthoracic echocardiogram with color flow and

Doppler was performed. The study was technically difficult with many images

being suboptimal in quality.

Reason For Study: Cardiomegaly with pulmonary vascular congestion



History: CARDIOMRGALY / CHF.

Ordering Physician: KEVIN LINK



Performed By: Evin Rey

_______________________________________________________________________________



Interpretation Summary

The left ventricle is normal in size.

LV EF is 65%

Left ventricular systolic function is normal.

The left ventricular wall motion is normal.

There is no thrombus.

There is no ventricular septal defect visualized.

The right ventricle is moderately dilated.

The right ventricular systolic function is mild to moderately reduced.

The right atrium is moderately dilated.

The left atrium is severely dilated.

The interatrial septum is intact with no evidence for an atrial septal defect.

There is no Doppler evidence for an interatrial shunt

There is no evidence of mitral valve prolapse.

There is no vegetation seen on the mitral valve.

There is no mitral valve stenosis.

There is a moderate to severe amount of mitral regurgitation

There is no aortic valvular vegetation.

There is no aortic valve stenosis

There is no LVOT obstruction.

No aortic regurgitation is present.

There is no tricuspid stenosis.

There is a moderate to severe amount of tricuspid regurgitation

The pulmonic valve is not well visualized.

The aortic root is normal size.

The inferior vena cava appeared dilated and decreased < 50% with respiration

(RAP 15-20 mmHg)

There is no pericardial effusion.

There is servere pulmonary hypertension by echo

rvsp is 145 to 150 mm of Hg with a RVSP of 10 to 5.



MMode/2D Measurements & Calculations

RVDd: 3.4 cm   LVIDd: 5.1 cm   FS: 36.9 %             Ao root diam: 2.5 cm



IVSd: 0.90 cm  LVIDs: 3.2 cm   EDV(Teich): 122.7 ml   Ao root area: 5.1 cm2

               LVPWd: 0.98 cm  ESV(Teich): 41.1 ml    LA dimension: 5.0 cm

                               EF(Teich): 66.5 %



Doppler Measurements & Calculations

MV E max marylou:      MV P1/2t max marylou:     Ao V2 max:         LV V1 max P.7 cm/sec       162.9 cm/sec          165.1 cm/sec       5.3 mmHg

MV A max marylou:      MV P1/2t: 37.1 msec   Ao max PG:         LV V1 max:

79.6 cm/sec        MVA(P1/2t): 5.9 cm2   10.9 mmHg          114.7 cm/sec

MV E/A: 2.1        MV dec slope:                            LV dP/dt:

                                                            4544 mmHg/s

                   1285 cm/sec2

                   MV dec time: 0.10 sec

        _______________________________________________________________

PA V2 max:         TR max marylou:           MV P1/2t-pr_phl:

123.4 cm/sec       582.4 cm/sec          37.1 msec

PA max P.1 mmHgTR max P.7 mmHg





Left Ventricle

The left ventricle is normal in size. There is normal left ventricular wall

thickness. LV EF is 65%. Left ventricular systolic function is normal. LV

diastolic function could not be adequately assessed due to atrial fibrilation.

The left ventricular wall motion is normal. There is no thrombus. There is no

ventricular septal defect visualized.



Right Ventricle

The right ventricle is moderately dilated. The right ventricular systolic

function is mild to moderately reduced.



Atria

The right atrium is moderately dilated. The left atrium is severely dilated.

The interatrial septum is intact with no evidence for an atrial septal defect.

There is no Doppler evidence for an interatrial shunt.



Mitral Valve

There is mild mitral annular calcification. There is no evidence of mitral

valve prolapse. There is no vegetation seen on the mitral valve. There is no

mitral valve stenosis. There is a moderate to severe amount of mitral

regurgitation.





Aortic Valve

There is no aortic valvular vegetation. There is no aortic valve stenosis.

There is no LVOT obstruction. No aortic regurgitation is present.



Tricuspid Valve

There is no tricuspid stenosis. There is a moderate to severe amount of

tricuspid regurgitation. There is servere pulmonary hypertension by echo. rvsp

is 145 to 150 mm of Hg with a RVSP of 10 to 5.



Pulmonic Valve

The pulmonic valve is not well visualized.



Great Vessels

The aortic root is normal size. The inferior vena cava appeared dilated and

decreased < 50% with respiration (RAP 15-20 mmHg).



Effusions

There is no pericardial effusion.





_______________________________________________________________________________

_______________________________________________________________________________



Electronically signed by:      Angelica Rousseau      on 2019 06:55 PM



CC: KEVIN LINK

>

Angelica Rousseau

## 2019-01-22 VITALS — SYSTOLIC BLOOD PRESSURE: 146 MMHG | DIASTOLIC BLOOD PRESSURE: 58 MMHG

## 2019-01-22 LAB
ANION GAP SERPL CALC-SCNC: 10 MMOL/L (ref 5–19)
BUN SERPL-MCNC: 87 MG/DL (ref 7–20)
CALCIUM: 8.7 MG/DL (ref 8.4–10.2)
CHLORIDE SERPL-SCNC: 98 MMOL/L (ref 98–107)
CO2 SERPL-SCNC: 28 MMOL/L (ref 22–30)
ERYTHROCYTE [DISTWIDTH] IN BLOOD BY AUTOMATED COUNT: 15.9 % (ref 11.5–14)
GLUCOSE SERPL-MCNC: 164 MG/DL (ref 75–110)
HCT VFR BLD CALC: 28 % (ref 36–47)
HGB BLD-MCNC: 9.3 G/DL (ref 12–15.5)
MCH RBC QN AUTO: 28.8 PG (ref 27–33.4)
MCHC RBC AUTO-ENTMCNC: 33.3 G/DL (ref 32–36)
MCV RBC AUTO: 87 FL (ref 80–97)
PLATELET # BLD: 230 10^3/UL (ref 150–450)
POTASSIUM SERPL-SCNC: 4.9 MMOL/L (ref 3.6–5)
RBC # BLD AUTO: 3.24 10^6/UL (ref 3.72–5.28)
SODIUM SERPL-SCNC: 135.9 MMOL/L (ref 137–145)
WBC # BLD AUTO: 13 10^3/UL (ref 4–10.5)

## 2019-01-22 RX ADMIN — CALCITRIOL SCH MCG: 0.25 CAPSULE, LIQUID FILLED ORAL at 10:12

## 2019-01-22 RX ADMIN — Medication SCH ML: at 06:17

## 2019-01-22 RX ADMIN — SITAGLIPTIN SCH MG: 25 TABLET, FILM COATED ORAL at 10:11

## 2019-01-22 RX ADMIN — INSULIN HUMAN PRN UNIT: 100 INJECTION, SOLUTION PARENTERAL at 10:13

## 2019-01-22 RX ADMIN — APIXABAN SCH MG: 2.5 TABLET, FILM COATED ORAL at 10:11

## 2019-01-22 RX ADMIN — DILTIAZEM HYDROCHLORIDE SCH MG: 120 CAPSULE, COATED, EXTENDED RELEASE ORAL at 10:10

## 2019-01-22 RX ADMIN — FUROSEMIDE SCH MG: 40 TABLET ORAL at 10:10

## 2019-01-22 RX ADMIN — INSULIN HUMAN PRN UNIT: 100 INJECTION, SOLUTION PARENTERAL at 17:13

## 2019-01-22 RX ADMIN — INSULIN GLARGINE SCH UNIT: 100 INJECTION, SOLUTION SUBCUTANEOUS at 10:14

## 2019-01-22 RX ADMIN — LEVOTHYROXINE SODIUM SCH MG: 112 TABLET ORAL at 06:17

## 2019-01-22 RX ADMIN — PREDNISONE SCH MG: 20 TABLET ORAL at 10:09

## 2019-01-22 RX ADMIN — FLUTICASONE PROPIONATE SCH SPRAYS: 50 SPRAY, METERED NASAL at 10:11

## 2019-01-22 RX ADMIN — FERROUS SULFATE TAB 325 MG (65 MG ELEMENTAL FE) SCH MG: 325 (65 FE) TAB at 10:09

## 2019-01-22 RX ADMIN — DOCUSATE SODIUM SCH MG: 100 CAPSULE, LIQUID FILLED ORAL at 10:09

## 2019-01-22 RX ADMIN — DIGOXIN SCH MG: 0.12 TABLET ORAL at 10:09

## 2019-01-22 RX ADMIN — METOPROLOL SUCCINATE SCH MG: 50 TABLET, EXTENDED RELEASE ORAL at 10:09

## 2019-01-22 RX ADMIN — NITROGLYCERIN SCH EACH: 0.2 PATCH TRANSDERMAL at 10:11

## 2019-01-22 RX ADMIN — ASPIRIN SCH MG: 81 TABLET, COATED ORAL at 10:09

## 2019-01-29 NOTE — PDOC DISCHARGE SUMMARY
General





- Admit/Disc Date/PCP


Admission Date/Primary Care Provider: 


  01/18/19 06:26





  JEREL ROPER PA-C





Discharge Date: 01/22/19





- Discharge Diagnosis


(1) Acute respiratory failure with hypoxia


Is this a current diagnosis for this admission?: Yes   


Summary: 





Acute respiratory failure with hypoxia secondary to COPD exacerbation and CHF 

exacerbation.  The patient does use home oxygen when sleeping but has not 

required it when ambulatory.  The patient admitted that she became short of 

breath when walking short distances; she could not walk to her mailbox without 

taking pauses.





Provided supplemental oxygen via nasal cannula as needed to maintain oxygen 

saturations >89%.


Nebulizer treatments as needed


Steroid treatment while inpatient followed by steroid taper 


Incentive spirometer and flutter valve 





On the day of discharge, the patient was able to tolerate walking for 6 minutes 

(with O2) without becoming hypoxic, tachynic, or experiencing dyspnea














(2) CHF (congestive heart failure)


Is this a current diagnosis for this admission?: Yes   


Summary: 


 





Echocardiogram showed significantly elevated RV pressures. Patient states she 

has no history of right-sided heart disease.





Has received IV furosemide for diuresis; has been transitioned to p.o. lasix.


Continue antihypertensives as above.


Daily aspirin and statin therapy.


Cardiac diet.


Daily weights and strict I&O's














(3) Diabetes


Is this a current diagnosis for this admission?: Yes   


Summary: 


No changes to home dose of Januvia.  


Lantus nightly.





Managed inpatient with [facility] with Humalog for sliding scale coverage.











(4) Anemia


Is this a current diagnosis for this admission?: Yes   


Summary: 





Patient reports chronic anemia followed by Dr. Aranda and RAMO Renner 

(nephrology).


She reports that she regularly receives iron infusions.


Does not know her baseline hemoglobin


Initially presented to [facility] with a hemoglobin of 8.9 which is trended downward to

7.6 despite diuresis.


Have started the patient on twice daily ferrous sulfate supplementation.


Received 1 U PRBC for symptomatic relief of SOB, her Hgb was 8.3 the day (1/21) 

she was transfused...above the normal threshold of transfusion.


























(5) Atrial fibrillation with RVR


Is this a current diagnosis for this admission?: Yes   


Summary: 





The patient presented to [facility] ED in AFIB with RVR


Her rate was initially controlled with IV Lopressor


Cardiology consulted


Rate control was achieved with PO digoxin, diltiazem, metoprolol


The patient takes renally dose Eliquis at home, this was continued while 

inpatient.














- Additional Information


Resuscitation Status: Full Code


Discharge Diet: Cardiac


Discharge Activity: Activity As Tolerated


Prescriptions: 


Ferrous Sulfate [Feosol 325 mg Tablet] 325 mg PO BIDPCBS #60 tablet


Furosemide [Lasix 40 mg Tablet] 40 mg PO BID #60 tablet


Nitroglycerin [Nitro-Dur 5 mg (0.2 mg/Hr) Transdermal  Patch] 1 each TD DAILY 

#30 patch.td24


Home Medications: 








Digoxin [Lanoxin 0.125 mg Tablet] 0.125 mg PO DAILY 12/22/13 


Furosemide [Lasix] 40 mg PO DAILY 12/22/13 


Levothyroxine Sodium [Synthroid 0.112 mg Tablet] 0.112 mg PO Q6AM 12/22/13 


Losartan Potassium 100 mg PO DAILY 12/22/13 


Simvastatin 40 mg PO QHS 12/22/13 


Diltiazem HCl [Diltiazem ER] 120 mg PO QAM MDD ON EMPTY STOMACH 05/09/18 


Insulin Glargine,Hum.rec.anlog [Toujeo Solostar] 46 unit SQ DAILY 05/09/18 


Linagliptin [Tradjenta] 5 mg PO DAILY 05/09/18 


Albuterol Sulfate [Proair HFA Inhalation Aerosol 8.5 gm MDI] 2 puff IH Q6HP PRN 

01/18/19 


Apixaban [Eliquis 2.5 mg Tablet] 2.5 mg PO BID 01/18/19 


Calcitriol [Rocaltrol 0.25 mcg Capsule] 0.25 mcg PO DAILY 01/18/19 


Clobetasol Propionate [Temovate 0.05% Cream 15 gm] 1 applic TP BIDP PRN 01/18/19




Hydroxyzine Pamoate [Vistaril 25 mg Capsule] 25 mg PO Q6HP PRN 01/18/19 


Levofloxacin [Levaquin 500 mg Tablet] 500 mg PO DAILY MDD filled 1/3 for 10 day 

supply 01/18/19 


Metoprolol Succinate [Toprol Xl 50 mg Tab.sr] 100 mg PO DAILY 01/18/19 


Morphine Sulfate [Morphabond ER] 15 mg PO Q12HP PRN 01/18/19 


Prednisone [Sterapred] 5 mg PO ASDIR MDD filled 1/3 for 6 day supply 01/18/19 


Aspirin [Ecotrin 81 mg EC Tablet] 81 mg PO DAILY  tabec 01/22/19 


Ferrous Sulfate [Feosol 325 mg Tablet] 325 mg PO BIDPCBS #60 tablet 01/22/19 


Furosemide [Lasix 40 mg Tablet] 40 mg PO BID #60 tablet 01/22/19 


Nitroglycerin [Nitro-Dur 5 mg (0.2 mg/Hr) Transdermal  Patch] 1 each TD DAILY 

#30 patch.td24 01/22/19 











History of Present Illness


History of Present Illness: 


ROBERT EVANGELISTA is a 83 year old female with an unclear past medical history 

of though presumed by medication regiment to include congestive heart failure, 

atrial fibrillation, CKD 3, oxygen dependent COPD, bronchitis and diabetes.  

Patient presents with 4 days of shortness of breath, rhinorrhea, sore throat and

GERD.  Evaluated in the emergency department 48 hours ago diagnosed with COPD 

exacerbation with bronchitis.  She was discharged home but continued to worsen 

prompting reevaluation the emergency room where she is found to have A. fib with

RVR, hypertensive urgency of 207/114, hyperkalemia, and cardiomegaly with 

pulmonary vascular congestion.  She receives several nebulizer treatments, 

placed on BiPAP and referred to the hospitalist for admission.  Patient is awake

and alert on BiPAP but remains short of breath verifying the above.  He denies 

chest pain nausea vomiting








Hospital Course


Hospital Course: 





The patient is an 83-year-old female with a past medical history of atrial 

fibrillation, hypertension, COPD, CHF, and DM2 who was admitted 1/18/19 for 

acute hypoxic respiratory failure secondary to COPD and CHF exacerbations. 





The patient's treatment plan is outlined above.





Patient was found to have severe pulmonary hypertension by echo with an RSVP of 

145-150 mm of Hg. Dr. Scott initially recommended transfer to [facility] for workup. 

The patient and her family (especially the daughter) did not want to transfer to

another hospital, they wanted to return to their home [facility] [place]. 

Discussed the case with the MICU attending at [facility] (Dr. Decker), he 

recommended obtaining a lactate level and have the patient complete a 6 minute 

walk test. If the patient could tolerate ambulation and her lactate was normal, 

she could be permitted to follow up as an outpatient with her doctor. 





The patient was able to ambulate (with O2) without becoming symptomatic (SOB, 

dyspnea, chest pain, dizziness) and she was able to maintain a normal SPO2. 

Additionally, her lactate was only 1.7. The patient was deemed safe for discha

[facility] but was instructed to follow up with a pulmonoligist in [place] within one 

week of discharge. The patient and family stated understanding. 





For any further details regarding this patient's hospitalization, please refer 

to the EMR.











Physical Exam


Vital Signs: 


                                        











Temp Pulse Resp BP Pulse Ox


 


 98.1 F   101 H  18   146/58 H  97 


 


 01/22/19 16:42  01/22/19 16:42  01/22/19 16:42  01/22/19 16:42  01/22/19 16:42














Results


Laboratory Results: 


                                        





                                 01/22/19 04:07 





                                 01/22/19 04:07 





                                        











  01/18/19 01/18/19 01/18/19





  03:05 06:35 06:35


 


Creatine Kinase   52 


 


CK-MB (CK-2)    1.63


 


Troponin I  0.024   0.036


 


NT-Pro-B Natriuret Pep    6890 H














  01/18/19 01/18/19 01/18/19





  12:27 12:27 18:30


 


Creatine Kinase  56   64


 


CK-MB (CK-2)   1.84 


 


Troponin I   0.019 


 


NT-Pro-B Natriuret Pep   














  01/18/19 01/19/19





  18:30 04:01


 


Creatine Kinase  


 


CK-MB (CK-2)  2.11 


 


Troponin I  0.027 


 


NT-Pro-B Natriuret Pep   9440 H











Impressions: 


                                        





Chest X-Ray  01/18/19 03:06


IMPRESSION:


 


No acute cardiopulmonary abnormality


 


 


copyright 2011 [employer]- All Rights Reserved


 











Status: Imported from PACS





Qualifiers





- *


PATIENT BEING DISCHARGED WITH ANY OF THE FOLLOWING DIAGNOSIS: Heart Failure


HF Pt being discharged on ACEI for LVEF less than 40%?: Yes


HF Pt being discharged on ARBS for LVEF less than 40%?: No


Reason(s) for not prescribing ARBS:: Not indicated - LVEF is 65%. Patient is 

already on an ACEI


HF Pt with Afib discharged with Warfarin?: No


Reason(s) for not prescribing Warfarin:: Not indicated - patient takes eliquis


HF Pt discharged on evidence-based Beta Blocker:: Yes





Plan


Time Spent: Greater than 30 Minutes